# Patient Record
Sex: FEMALE | Race: WHITE | NOT HISPANIC OR LATINO | Employment: OTHER | URBAN - METROPOLITAN AREA
[De-identification: names, ages, dates, MRNs, and addresses within clinical notes are randomized per-mention and may not be internally consistent; named-entity substitution may affect disease eponyms.]

---

## 2021-12-30 ENCOUNTER — HOSPITAL ENCOUNTER (INPATIENT)
Facility: MEDICAL CENTER | Age: 81
LOS: 3 days | DRG: 682 | End: 2022-01-03
Attending: EMERGENCY MEDICINE | Admitting: STUDENT IN AN ORGANIZED HEALTH CARE EDUCATION/TRAINING PROGRAM
Payer: MEDICARE

## 2021-12-30 ENCOUNTER — APPOINTMENT (OUTPATIENT)
Dept: RADIOLOGY | Facility: MEDICAL CENTER | Age: 81
DRG: 682 | End: 2021-12-30
Attending: EMERGENCY MEDICINE
Payer: MEDICARE

## 2021-12-30 DIAGNOSIS — I25.810 CORONARY ARTERY DISEASE INVOLVING CORONARY BYPASS GRAFT OF NATIVE HEART WITHOUT ANGINA PECTORIS: ICD-10-CM

## 2021-12-30 DIAGNOSIS — I50.20 HFREF (HEART FAILURE WITH REDUCED EJECTION FRACTION) (HCC): ICD-10-CM

## 2021-12-30 DIAGNOSIS — N17.9 PRERENAL ACUTE RENAL FAILURE (HCC): ICD-10-CM

## 2021-12-30 DIAGNOSIS — R42 DIZZINESS: ICD-10-CM

## 2021-12-30 LAB
ALBUMIN SERPL BCP-MCNC: 4.5 G/DL (ref 3.2–4.9)
ALBUMIN/GLOB SERPL: 1.3 G/DL
ALP SERPL-CCNC: 60 U/L (ref 30–99)
ALT SERPL-CCNC: 13 U/L (ref 2–50)
ANION GAP SERPL CALC-SCNC: 14 MMOL/L (ref 7–16)
AST SERPL-CCNC: 27 U/L (ref 12–45)
BASOPHILS # BLD AUTO: 0.7 % (ref 0–1.8)
BASOPHILS # BLD: 0.05 K/UL (ref 0–0.12)
BILIRUB SERPL-MCNC: 0.4 MG/DL (ref 0.1–1.5)
BUN SERPL-MCNC: 81 MG/DL (ref 8–22)
CALCIUM SERPL-MCNC: 9.6 MG/DL (ref 8.4–10.2)
CHLORIDE SERPL-SCNC: 98 MMOL/L (ref 96–112)
CO2 SERPL-SCNC: 25 MMOL/L (ref 20–33)
CREAT SERPL-MCNC: 2.17 MG/DL (ref 0.5–1.4)
EKG IMPRESSION: NORMAL
EOSINOPHIL # BLD AUTO: 0.08 K/UL (ref 0–0.51)
EOSINOPHIL NFR BLD: 1.1 % (ref 0–6.9)
ERYTHROCYTE [DISTWIDTH] IN BLOOD BY AUTOMATED COUNT: 47.4 FL (ref 35.9–50)
GLOBULIN SER CALC-MCNC: 3.5 G/DL (ref 1.9–3.5)
GLUCOSE SERPL-MCNC: 163 MG/DL (ref 65–99)
HCT VFR BLD AUTO: 32.1 % (ref 37–47)
HGB BLD-MCNC: 9.9 G/DL (ref 12–16)
IMM GRANULOCYTES # BLD AUTO: 0.01 K/UL (ref 0–0.11)
IMM GRANULOCYTES NFR BLD AUTO: 0.1 % (ref 0–0.9)
LYMPHOCYTES # BLD AUTO: 2.28 K/UL (ref 1–4.8)
LYMPHOCYTES NFR BLD: 31 % (ref 22–41)
MCH RBC QN AUTO: 28.8 PG (ref 27–33)
MCHC RBC AUTO-ENTMCNC: 30.8 G/DL (ref 33.6–35)
MCV RBC AUTO: 93.3 FL (ref 81.4–97.8)
MONOCYTES # BLD AUTO: 0.53 K/UL (ref 0–0.85)
MONOCYTES NFR BLD AUTO: 7.2 % (ref 0–13.4)
NEUTROPHILS # BLD AUTO: 4.41 K/UL (ref 2–7.15)
NEUTROPHILS NFR BLD: 59.9 % (ref 44–72)
NRBC # BLD AUTO: 0 K/UL
NRBC BLD-RTO: 0 /100 WBC
PLATELET # BLD AUTO: 198 K/UL (ref 164–446)
PMV BLD AUTO: 10.1 FL (ref 9–12.9)
POTASSIUM SERPL-SCNC: 4.4 MMOL/L (ref 3.6–5.5)
PROT SERPL-MCNC: 8 G/DL (ref 6–8.2)
RBC # BLD AUTO: 3.44 M/UL (ref 4.2–5.4)
SODIUM SERPL-SCNC: 137 MMOL/L (ref 135–145)
TROPONIN T SERPL-MCNC: 54 NG/L (ref 6–19)
WBC # BLD AUTO: 7.4 K/UL (ref 4.8–10.8)

## 2021-12-30 PROCEDURE — 93005 ELECTROCARDIOGRAM TRACING: CPT

## 2021-12-30 PROCEDURE — 93005 ELECTROCARDIOGRAM TRACING: CPT | Performed by: EMERGENCY MEDICINE

## 2021-12-30 PROCEDURE — 70450 CT HEAD/BRAIN W/O DYE: CPT

## 2021-12-30 PROCEDURE — 36415 COLL VENOUS BLD VENIPUNCTURE: CPT

## 2021-12-30 PROCEDURE — 80053 COMPREHEN METABOLIC PANEL: CPT

## 2021-12-30 PROCEDURE — 85025 COMPLETE CBC W/AUTO DIFF WBC: CPT

## 2021-12-30 PROCEDURE — 99285 EMERGENCY DEPT VISIT HI MDM: CPT

## 2021-12-30 PROCEDURE — 84484 ASSAY OF TROPONIN QUANT: CPT

## 2021-12-31 PROBLEM — I50.20 HFREF (HEART FAILURE WITH REDUCED EJECTION FRACTION) (HCC): Status: ACTIVE | Noted: 2021-12-31

## 2021-12-31 PROBLEM — R42 POSTURAL DIZZINESS WITH PRESYNCOPE: Status: ACTIVE | Noted: 2021-12-31

## 2021-12-31 PROBLEM — R55 POSTURAL DIZZINESS WITH PRESYNCOPE: Status: ACTIVE | Noted: 2021-12-31

## 2021-12-31 PROBLEM — R79.89 ELEVATED TROPONIN: Status: ACTIVE | Noted: 2021-12-31

## 2021-12-31 PROBLEM — E78.5 HYPERLIPIDEMIA: Status: ACTIVE | Noted: 2021-12-31

## 2021-12-31 PROBLEM — E11.65 TYPE 2 DIABETES MELLITUS WITH HYPERGLYCEMIA, WITHOUT LONG-TERM CURRENT USE OF INSULIN (HCC): Status: ACTIVE | Noted: 2021-12-31

## 2021-12-31 PROBLEM — E03.9 ACQUIRED HYPOTHYROIDISM: Status: ACTIVE | Noted: 2021-12-31

## 2021-12-31 PROBLEM — N17.9 ACUTE KIDNEY INJURY SUPERIMPOSED ON CHRONIC KIDNEY DISEASE (HCC): Status: ACTIVE | Noted: 2021-12-31

## 2021-12-31 PROBLEM — I10 PRIMARY HYPERTENSION: Status: ACTIVE | Noted: 2021-12-31

## 2021-12-31 PROBLEM — K21.9 GASTROESOPHAGEAL REFLUX DISEASE WITHOUT ESOPHAGITIS: Status: ACTIVE | Noted: 2021-12-31

## 2021-12-31 PROBLEM — I25.810 CORONARY ARTERY DISEASE INVOLVING CORONARY BYPASS GRAFT OF NATIVE HEART WITHOUT ANGINA PECTORIS: Status: ACTIVE | Noted: 2021-12-31

## 2021-12-31 PROBLEM — N18.9 ACUTE KIDNEY INJURY SUPERIMPOSED ON CHRONIC KIDNEY DISEASE (HCC): Status: ACTIVE | Noted: 2021-12-31

## 2021-12-31 LAB
APPEARANCE UR: CLEAR
BACTERIA #/AREA URNS HPF: ABNORMAL /HPF
BILIRUB UR QL STRIP.AUTO: NEGATIVE
CK SERPL-CCNC: 143 U/L (ref 0–154)
COLOR UR: YELLOW
EPI CELLS #/AREA URNS HPF: ABNORMAL /HPF
GLUCOSE BLD-MCNC: 140 MG/DL (ref 65–99)
GLUCOSE BLD-MCNC: 184 MG/DL (ref 65–99)
GLUCOSE BLD-MCNC: 223 MG/DL (ref 65–99)
GLUCOSE UR STRIP.AUTO-MCNC: NEGATIVE MG/DL
KETONES UR STRIP.AUTO-MCNC: NEGATIVE MG/DL
LEUKOCYTE ESTERASE UR QL STRIP.AUTO: ABNORMAL
MAGNESIUM SERPL-MCNC: 2.4 MG/DL (ref 1.5–2.5)
MICRO URNS: ABNORMAL
NITRITE UR QL STRIP.AUTO: NEGATIVE
PH UR STRIP.AUTO: 5 [PH] (ref 5–8)
PHOSPHATE SERPL-MCNC: 4.5 MG/DL (ref 2.5–4.5)
PROT UR QL STRIP: 30 MG/DL
RBC # URNS HPF: ABNORMAL /HPF
RBC UR QL AUTO: NEGATIVE
SP GR UR STRIP.AUTO: 1.01
TROPONIN T SERPL-MCNC: 58 NG/L (ref 6–19)
TROPONIN T SERPL-MCNC: 67 NG/L (ref 6–19)
WBC #/AREA URNS HPF: ABNORMAL /HPF

## 2021-12-31 PROCEDURE — 700105 HCHG RX REV CODE 258: Performed by: STUDENT IN AN ORGANIZED HEALTH CARE EDUCATION/TRAINING PROGRAM

## 2021-12-31 PROCEDURE — 82962 GLUCOSE BLOOD TEST: CPT | Mod: 91

## 2021-12-31 PROCEDURE — A9270 NON-COVERED ITEM OR SERVICE: HCPCS | Performed by: STUDENT IN AN ORGANIZED HEALTH CARE EDUCATION/TRAINING PROGRAM

## 2021-12-31 PROCEDURE — 770006 HCHG ROOM/CARE - MED/SURG/GYN SEMI*

## 2021-12-31 PROCEDURE — 81001 URINALYSIS AUTO W/SCOPE: CPT

## 2021-12-31 PROCEDURE — 96372 THER/PROPH/DIAG INJ SC/IM: CPT

## 2021-12-31 PROCEDURE — 700102 HCHG RX REV CODE 250 W/ 637 OVERRIDE(OP): Performed by: STUDENT IN AN ORGANIZED HEALTH CARE EDUCATION/TRAINING PROGRAM

## 2021-12-31 PROCEDURE — 700101 HCHG RX REV CODE 250: Performed by: STUDENT IN AN ORGANIZED HEALTH CARE EDUCATION/TRAINING PROGRAM

## 2021-12-31 PROCEDURE — 83735 ASSAY OF MAGNESIUM: CPT

## 2021-12-31 PROCEDURE — 84100 ASSAY OF PHOSPHORUS: CPT

## 2021-12-31 PROCEDURE — 82550 ASSAY OF CK (CPK): CPT

## 2021-12-31 PROCEDURE — 700111 HCHG RX REV CODE 636 W/ 250 OVERRIDE (IP): Performed by: STUDENT IN AN ORGANIZED HEALTH CARE EDUCATION/TRAINING PROGRAM

## 2021-12-31 PROCEDURE — 84484 ASSAY OF TROPONIN QUANT: CPT | Mod: 91

## 2021-12-31 PROCEDURE — 99223 1ST HOSP IP/OBS HIGH 75: CPT | Mod: AI | Performed by: STUDENT IN AN ORGANIZED HEALTH CARE EDUCATION/TRAINING PROGRAM

## 2021-12-31 RX ORDER — TRAZODONE HYDROCHLORIDE 50 MG/1
50 TABLET ORAL NIGHTLY PRN
COMMUNITY
Start: 2021-08-19

## 2021-12-31 RX ORDER — INSULIN GLARGINE 100 [IU]/ML
22 INJECTION, SOLUTION SUBCUTANEOUS EVERY EVENING
COMMUNITY
Start: 2021-10-12

## 2021-12-31 RX ORDER — LEVOTHYROXINE SODIUM 175 UG/1
175 TABLET ORAL
Status: DISCONTINUED | OUTPATIENT
Start: 2021-12-31 | End: 2022-01-03 | Stop reason: HOSPADM

## 2021-12-31 RX ORDER — ACETAMINOPHEN 325 MG/1
650 TABLET ORAL EVERY 6 HOURS PRN
Status: DISCONTINUED | OUTPATIENT
Start: 2021-12-31 | End: 2022-01-03 | Stop reason: HOSPADM

## 2021-12-31 RX ORDER — LIDOCAINE 50 MG/G
1 PATCH TOPICAL EVERY 24 HOURS
Status: DISCONTINUED | OUTPATIENT
Start: 2021-12-31 | End: 2022-01-03 | Stop reason: HOSPADM

## 2021-12-31 RX ORDER — AMOXICILLIN 500 MG/1
CAPSULE ORAL
Status: SHIPPED | COMMUNITY
Start: 2021-09-27 | End: 2021-12-31

## 2021-12-31 RX ORDER — PANTOPRAZOLE SODIUM 40 MG/1
40 TABLET, DELAYED RELEASE ORAL DAILY
Status: DISCONTINUED | OUTPATIENT
Start: 2021-12-31 | End: 2021-12-31

## 2021-12-31 RX ORDER — GABAPENTIN 600 MG/1
1 TABLET ORAL 2 TIMES DAILY
COMMUNITY
Start: 2021-10-12

## 2021-12-31 RX ORDER — TRAZODONE HYDROCHLORIDE 50 MG/1
50 TABLET ORAL
Status: DISCONTINUED | OUTPATIENT
Start: 2021-12-31 | End: 2022-01-03 | Stop reason: HOSPADM

## 2021-12-31 RX ORDER — ATORVASTATIN CALCIUM 20 MG/1
20 TABLET, FILM COATED ORAL DAILY
Status: DISCONTINUED | OUTPATIENT
Start: 2021-12-31 | End: 2022-01-03 | Stop reason: HOSPADM

## 2021-12-31 RX ORDER — FUROSEMIDE 40 MG/1
40 TABLET ORAL
Status: ON HOLD | COMMUNITY
Start: 2021-11-28 | End: 2022-01-03

## 2021-12-31 RX ORDER — POLYETHYLENE GLYCOL 3350 17 G/17G
1 POWDER, FOR SOLUTION ORAL
Status: DISCONTINUED | OUTPATIENT
Start: 2021-12-31 | End: 2022-01-03 | Stop reason: HOSPADM

## 2021-12-31 RX ORDER — CARVEDILOL 3.12 MG/1
3.12 TABLET ORAL 2 TIMES DAILY
Status: ON HOLD | COMMUNITY
Start: 2021-12-28 | End: 2022-01-03

## 2021-12-31 RX ORDER — CONJUGATED ESTROGENS 0.62 MG/G
2 CREAM VAGINAL
Status: SHIPPED | COMMUNITY
Start: 2021-11-10 | End: 2021-12-31

## 2021-12-31 RX ORDER — OMEPRAZOLE 20 MG/1
20 CAPSULE, DELAYED RELEASE ORAL DAILY
Status: DISCONTINUED | OUTPATIENT
Start: 2021-12-31 | End: 2022-01-03 | Stop reason: HOSPADM

## 2021-12-31 RX ORDER — LEVOTHYROXINE SODIUM 175 UG/1
175 TABLET ORAL
COMMUNITY
Start: 2021-12-01

## 2021-12-31 RX ORDER — BISACODYL 10 MG
10 SUPPOSITORY, RECTAL RECTAL
Status: DISCONTINUED | OUTPATIENT
Start: 2021-12-31 | End: 2022-01-03 | Stop reason: HOSPADM

## 2021-12-31 RX ORDER — SODIUM CHLORIDE, SODIUM LACTATE, POTASSIUM CHLORIDE, CALCIUM CHLORIDE 600; 310; 30; 20 MG/100ML; MG/100ML; MG/100ML; MG/100ML
INJECTION, SOLUTION INTRAVENOUS CONTINUOUS
Status: DISCONTINUED | OUTPATIENT
Start: 2021-12-31 | End: 2022-01-01

## 2021-12-31 RX ORDER — CHLORHEXIDINE GLUCONATE ORAL RINSE 1.2 MG/ML
SOLUTION DENTAL
Status: SHIPPED | COMMUNITY
Start: 2021-10-20 | End: 2021-12-31

## 2021-12-31 RX ORDER — CARVEDILOL 6.25 MG/1
3.12 TABLET ORAL 2 TIMES DAILY WITH MEALS
Status: DISCONTINUED | OUTPATIENT
Start: 2021-12-31 | End: 2021-12-31

## 2021-12-31 RX ORDER — HEPARIN SODIUM 5000 [USP'U]/ML
5000 INJECTION, SOLUTION INTRAVENOUS; SUBCUTANEOUS EVERY 8 HOURS
Status: DISCONTINUED | OUTPATIENT
Start: 2021-12-31 | End: 2022-01-03 | Stop reason: HOSPADM

## 2021-12-31 RX ORDER — AMOXICILLIN 250 MG
2 CAPSULE ORAL 2 TIMES DAILY
Status: DISCONTINUED | OUTPATIENT
Start: 2021-12-31 | End: 2022-01-03 | Stop reason: HOSPADM

## 2021-12-31 RX ORDER — ACETAMINOPHEN 500 MG
500-1000 TABLET ORAL EVERY 6 HOURS PRN
Status: ON HOLD | COMMUNITY
End: 2022-01-03

## 2021-12-31 RX ORDER — ONDANSETRON 2 MG/ML
4 INJECTION INTRAMUSCULAR; INTRAVENOUS EVERY 4 HOURS PRN
Status: DISCONTINUED | OUTPATIENT
Start: 2021-12-31 | End: 2022-01-03 | Stop reason: HOSPADM

## 2021-12-31 RX ORDER — ONDANSETRON 4 MG/1
4 TABLET, ORALLY DISINTEGRATING ORAL EVERY 4 HOURS PRN
Status: DISCONTINUED | OUTPATIENT
Start: 2021-12-31 | End: 2022-01-03 | Stop reason: HOSPADM

## 2021-12-31 RX ORDER — LABETALOL HYDROCHLORIDE 5 MG/ML
10 INJECTION, SOLUTION INTRAVENOUS EVERY 4 HOURS PRN
Status: DISCONTINUED | OUTPATIENT
Start: 2021-12-31 | End: 2022-01-03 | Stop reason: HOSPADM

## 2021-12-31 RX ORDER — DEXTROSE MONOHYDRATE 25 G/50ML
50 INJECTION, SOLUTION INTRAVENOUS
Status: DISCONTINUED | OUTPATIENT
Start: 2021-12-31 | End: 2022-01-03 | Stop reason: HOSPADM

## 2021-12-31 RX ORDER — PANTOPRAZOLE SODIUM 40 MG/1
40 TABLET, DELAYED RELEASE ORAL DAILY
COMMUNITY

## 2021-12-31 RX ORDER — ATORVASTATIN CALCIUM 20 MG/1
20 TABLET, FILM COATED ORAL DAILY
COMMUNITY
Start: 2021-08-19 | End: 2022-02-15

## 2021-12-31 RX ORDER — CARVEDILOL 3.12 MG/1
3.12 TABLET ORAL
Status: SHIPPED | COMMUNITY
Start: 2021-11-28 | End: 2021-12-31

## 2021-12-31 RX ORDER — HYDRALAZINE HYDROCHLORIDE 10 MG/1
TABLET, FILM COATED ORAL
Status: SHIPPED | COMMUNITY
Start: 2021-12-28 | End: 2021-12-31

## 2021-12-31 RX ORDER — GABAPENTIN 300 MG/1
600 CAPSULE ORAL 3 TIMES DAILY
Status: DISCONTINUED | OUTPATIENT
Start: 2021-12-31 | End: 2022-01-03 | Stop reason: HOSPADM

## 2021-12-31 RX ADMIN — ASPIRIN 81 MG: 81 TABLET, COATED ORAL at 08:12

## 2021-12-31 RX ADMIN — HEPARIN SODIUM 5000 UNITS: 5000 INJECTION, SOLUTION INTRAVENOUS; SUBCUTANEOUS at 13:45

## 2021-12-31 RX ADMIN — GABAPENTIN 600 MG: 300 CAPSULE ORAL at 08:08

## 2021-12-31 RX ADMIN — LIDOCAINE 1 PATCH: 50 PATCH TOPICAL at 06:14

## 2021-12-31 RX ADMIN — ATORVASTATIN CALCIUM 20 MG: 20 TABLET, FILM COATED ORAL at 08:08

## 2021-12-31 RX ADMIN — SERTRALINE HYDROCHLORIDE 50 MG: 50 TABLET ORAL at 08:00

## 2021-12-31 RX ADMIN — INSULIN HUMAN 4 UNITS: 100 INJECTION, SOLUTION PARENTERAL at 17:22

## 2021-12-31 RX ADMIN — GABAPENTIN 600 MG: 300 CAPSULE ORAL at 18:00

## 2021-12-31 RX ADMIN — HEPARIN SODIUM 5000 UNITS: 5000 INJECTION, SOLUTION INTRAVENOUS; SUBCUTANEOUS at 21:15

## 2021-12-31 RX ADMIN — INSULIN HUMAN 3 UNITS: 100 INJECTION, SOLUTION PARENTERAL at 11:12

## 2021-12-31 RX ADMIN — INSULIN GLARGINE 20 UNITS: 100 INJECTION, SOLUTION SUBCUTANEOUS at 17:23

## 2021-12-31 RX ADMIN — ACETAMINOPHEN 650 MG: 325 TABLET, FILM COATED ORAL at 04:34

## 2021-12-31 RX ADMIN — SENNOSIDES AND DOCUSATE SODIUM 2 TABLET: 50; 8.6 TABLET ORAL at 18:00

## 2021-12-31 RX ADMIN — GABAPENTIN 600 MG: 300 CAPSULE ORAL at 13:45

## 2021-12-31 RX ADMIN — LEVOTHYROXINE SODIUM 175 MCG: 0.17 TABLET ORAL at 06:14

## 2021-12-31 RX ADMIN — OMEPRAZOLE 20 MG: 20 CAPSULE, DELAYED RELEASE ORAL at 08:09

## 2021-12-31 RX ADMIN — HEPARIN SODIUM 5000 UNITS: 5000 INJECTION, SOLUTION INTRAVENOUS; SUBCUTANEOUS at 03:37

## 2021-12-31 RX ADMIN — SODIUM CHLORIDE, POTASSIUM CHLORIDE, SODIUM LACTATE AND CALCIUM CHLORIDE: 600; 310; 30; 20 INJECTION, SOLUTION INTRAVENOUS at 03:37

## 2021-12-31 ASSESSMENT — LIFESTYLE VARIABLES
HAVE YOU EVER FELT YOU SHOULD CUT DOWN ON YOUR DRINKING: NO
TOTAL SCORE: 0
SUBSTANCE_ABUSE: 0
CONSUMPTION TOTAL: NEGATIVE
TOTAL SCORE: 0
HAVE PEOPLE ANNOYED YOU BY CRITICIZING YOUR DRINKING: NO
ON A TYPICAL DAY WHEN YOU DRINK ALCOHOL HOW MANY DRINKS DO YOU HAVE: 0
EVER HAD A DRINK FIRST THING IN THE MORNING TO STEADY YOUR NERVES TO GET RID OF A HANGOVER: NO
TOTAL SCORE: 0
EVER FELT BAD OR GUILTY ABOUT YOUR DRINKING: NO
AVERAGE NUMBER OF DAYS PER WEEK YOU HAVE A DRINK CONTAINING ALCOHOL: 0
ALCOHOL_USE: NO
HOW MANY TIMES IN THE PAST YEAR HAVE YOU HAD 5 OR MORE DRINKS IN A DAY: 0

## 2021-12-31 ASSESSMENT — ENCOUNTER SYMPTOMS
CHILLS: 0
FEVER: 0
NERVOUS/ANXIOUS: 0
EYE PAIN: 0
SEIZURES: 0
DIZZINESS: 1
VOMITING: 0
MYALGIAS: 0
NAUSEA: 0
SHORTNESS OF BREATH: 0
PALPITATIONS: 0
SPEECH CHANGE: 0
SPUTUM PRODUCTION: 0
SENSORY CHANGE: 0
NECK PAIN: 0
LOSS OF CONSCIOUSNESS: 0
WEAKNESS: 1
DIARRHEA: 0
FOCAL WEAKNESS: 0
COUGH: 0
ABDOMINAL PAIN: 0
PHOTOPHOBIA: 0

## 2021-12-31 ASSESSMENT — COGNITIVE AND FUNCTIONAL STATUS - GENERAL
WALKING IN HOSPITAL ROOM: A LITTLE
MOBILITY SCORE: 21
DAILY ACTIVITIY SCORE: 24
SUGGESTED CMS G CODE MODIFIER DAILY ACTIVITY: CH
STANDING UP FROM CHAIR USING ARMS: A LITTLE
CLIMB 3 TO 5 STEPS WITH RAILING: A LITTLE
SUGGESTED CMS G CODE MODIFIER MOBILITY: CJ

## 2021-12-31 ASSESSMENT — PAIN DESCRIPTION - PAIN TYPE
TYPE: ACUTE PAIN

## 2021-12-31 ASSESSMENT — PATIENT HEALTH QUESTIONNAIRE - PHQ9
1. LITTLE INTEREST OR PLEASURE IN DOING THINGS: NOT AT ALL
SUM OF ALL RESPONSES TO PHQ9 QUESTIONS 1 AND 2: 0
2. FEELING DOWN, DEPRESSED, IRRITABLE, OR HOPELESS: NOT AT ALL

## 2021-12-31 ASSESSMENT — FIBROSIS 4 INDEX: FIB4 SCORE: 3.03

## 2021-12-31 NOTE — ASSESSMENT & PLAN NOTE
Per outpatient cardiology note in care everywhere due to worsening renal function she is not on ACE inhibitor, ARB or Entresto, not on Aldactone  Echocardiogram done 1/1/2022 showing EF 25%, reduced right ventricular systolic function, mild mitral and tricuspid regurgitation.  RVSP 55.  Continue aspirin, hold BB pending stress test

## 2021-12-31 NOTE — ASSESSMENT & PLAN NOTE
Experienced outpatient x2 with decreased p.o. intake and continued diuretic use.  Patient also reports she was recently started on carvedilol.  Carvedilol was held on admission, transitioned to metoprolol for less systemic effects.  Monitor on telemetry.  IV fluids

## 2021-12-31 NOTE — H&P
Hospital Medicine History & Physical Note    Date of Service  12/31/2021    Primary Care Physician  No primary care provider on file.    Consultants  None    Code Status  Full Code    Chief Complaint  Chief Complaint   Patient presents with   • Dizziness     dizzy spell while walking in the mall, had an assisted fall to the ground. Denies LOC and remembers the entire event. No hx of similar events. Recently began taking carvedilol a month ago but has never had any issues.        History of Presenting Illness  Bita Griffiths is a 81 y.o. female from [facility] visiting family with medical history of HFrEF prior EF 25 to 30%, CAD status post 5 vessel CABG and mitral valve repair CKD with creatinine of 2.3 in November 2021, hypertension, hyperlipidemia, iron deficient anemia, diabetes who presented 12/30/2021 with dizziness, 2 episodes of presyncope.  Patient is visiting family from [place] and notes that she has not been having normal p.o. intake due to concerns of not wanting to urinate while traveling, she has continued to take her diuretic.  She had an episode of presyncope while walking around the mall earlier in the day felt dizzy family members helped her to the ground later down in her symptoms were away, she did not lose consciousness.  She came to the ED for evaluation while in the ED since she was feeling better she got up and was walking herself to the bathroom when she began feeling dizzy lightheaded needed to be helped to the floor, again did not lose consciousness.  She did take her diuretic after her first episode of presyncope earlier in the day, she did not increase her p.o. intake.  Patient is unsure about a lot of her medical history most information obtained through chart review and care everywhere.  She denies recent infectious symptoms such as fevers or chills, cough or dyspnea, nausea vomiting diarrhea dysuria.  Symptoms started acutely, aggravated by standing up alleviated by  lying down.  In the ED initial blood pressure 87/41, slightly tachypneic otherwise normal vitals.  Initial work-up no leukocytosis, hemoglobin 9.9, CHEM panel with normal electrolytes, BUN 81 creatinine 2.17 which is up from prior BUN 50 serum creatinine 1.8, troponin T mildly elevated 54.  CT head negative for acute cerebral infarction hemorrhage or mass lesion, hypodensity in the right parieto-occipital lobe likely encephalomalacia.  Patient was given IV fluid subsequently referred to hospitalist for admission for presyncope, dehydration, FREDA on CKD.    I discussed the plan of care with patient, family, bedside RN and pharmacy.    Review of Systems  Review of Systems   Constitutional: Negative for chills and fever.   HENT: Negative for congestion and nosebleeds.    Eyes: Negative for photophobia and pain.   Respiratory: Negative for cough, sputum production and shortness of breath.    Cardiovascular: Negative for chest pain and palpitations.   Gastrointestinal: Negative for abdominal pain, diarrhea, nausea and vomiting.   Genitourinary: Negative for dysuria and urgency.   Musculoskeletal: Negative for myalgias and neck pain.   Neurological: Positive for dizziness and weakness. Negative for sensory change, speech change, focal weakness, seizures and loss of consciousness.   Psychiatric/Behavioral: Negative for substance abuse. The patient is not nervous/anxious.        Past Medical History   has no past medical history on file.    Surgical History   has no past surgical history on file.     Family History  family history is not on file.   Family history HTN    Social History   reports that she has never smoked. She has never used smokeless tobacco. She reports that she does not drink alcohol and does not use drugs.    Allergies  Allergies   Allergen Reactions   • Sulfa Drugs      Stomach ulcers       Medications  Prior to Admission Medications   Prescriptions Last Dose Informant Patient Reported? Taking?    atorvastatin (LIPITOR) 20 MG Tab 12/30/2021 at 0900  Yes Yes   Sig: Take 1 Tablet by mouth every day.   carvedilol (COREG) 3.125 MG Tab   Yes Yes   Sig: Take 3.125 mg by mouth 2 times a day.   epoetin dari (EPOGEN/PROCRIT) 91721 UNIT/ML Solution   Yes No   furosemide (LASIX) 40 MG Tab 12/30/2021 at 0900  Yes Yes   Sig: Take 40 mg by mouth every day.   gabapentin (NEURONTIN) 600 MG tablet 12/30/2021 at 0900  Yes Yes   Sig: Take 1 Tablet by mouth 3 times a day.   hydrALAZINE (APRESOLINE) 10 MG Tab 12/30/2021 at 0900  Yes Yes   Sig: Indications: high blood pressure Take 10mg po 2 times daily   insulin glargine (LANTUS SOLOSTAR) 100 UNIT/ML Solution Pen-injector injection 12/30/2021 at 0200  Yes Yes   Sig: INJECT 22 UNITS UNDER THE SKIN DAILY AT BEDTIME   levothyroxine (SYNTHROID) 175 MCG Tab 12/30/2021 at 0900 Patient Yes Yes   Sig: Take 175 mcg by mouth every morning on an empty stomach.   pantoprazole (PROTONIX) 40 MG Tablet Delayed Response 12/30/2021 at 0900  Yes Yes   Sig: Take 40 mg by mouth every day.   sertraline (ZOLOFT) 50 MG Tab 12/30/2021 at 0900  Yes Yes   Sig: Take 1 Tablet by mouth every morning.   traZODone (DESYREL) 50 MG Tab   Yes Yes   Sig: TAKE 1 TABLET DAILY AT BEDTIME IF NEEDED      Facility-Administered Medications: None       Physical Exam  Temp:  [36.4 °C (97.6 °F)] 36.4 °C (97.6 °F)  Pulse:  [77-92] 84  Resp:  [12-21] 16  BP: ()/(41-63) 107/44  SpO2:  [91 %-98 %] 92 %  Blood Pressure : 120/49   Temperature: 36.4 °C (97.6 °F)   Pulse: 84   Respiration: 12   Pulse Oximetry: 92 %       Physical Exam  Vitals and nursing note reviewed. Exam conducted with a chaperone present.   Constitutional:       General: She is not in acute distress.     Appearance: She is obese. She is not toxic-appearing.   HENT:      Head: Normocephalic and atraumatic.      Nose: Nose normal. No rhinorrhea.      Mouth/Throat:      Mouth: Mucous membranes are dry.      Pharynx: Oropharynx is clear.   Eyes:       Extraocular Movements: Extraocular movements intact.      Conjunctiva/sclera: Conjunctivae normal.      Pupils: Pupils are equal, round, and reactive to light.   Cardiovascular:      Rate and Rhythm: Normal rate.      Pulses: Normal pulses.      Heart sounds: Murmur (systolic) heard.       Pulmonary:      Effort: Pulmonary effort is normal. No respiratory distress.      Breath sounds: No wheezing, rhonchi or rales.   Abdominal:      General: Bowel sounds are normal.      Palpations: Abdomen is soft.      Tenderness: There is no abdominal tenderness. There is no guarding or rebound.   Musculoskeletal:         General: No tenderness or deformity. Normal range of motion.      Cervical back: Normal range of motion and neck supple.   Skin:     General: Skin is warm and dry.      Capillary Refill: Capillary refill takes less than 2 seconds.   Neurological:      General: No focal deficit present.      Mental Status: She is alert and oriented to person, place, and time. Mental status is at baseline.      Cranial Nerves: No cranial nerve deficit.      Sensory: No sensory deficit.      Motor: No weakness.   Psychiatric:         Mood and Affect: Mood normal.         Behavior: Behavior normal.         Thought Content: Thought content normal.         Judgment: Judgment normal.         Laboratory:  Recent Labs     12/30/21 2250   WBC 7.4   RBC 3.44*   HEMOGLOBIN 9.9*   HEMATOCRIT 32.1*   MCV 93.3   MCH 28.8   MCHC 30.8*   RDW 47.4   PLATELETCT 198   MPV 10.1     Recent Labs     12/30/21 2250   SODIUM 137   POTASSIUM 4.4   CHLORIDE 98   CO2 25   GLUCOSE 163*   BUN 81*   CREATININE 2.17*   CALCIUM 9.6     Recent Labs     12/30/21 2250   ALTSGPT 13   ASTSGOT 27   ALKPHOSPHAT 60   TBILIRUBIN 0.4   GLUCOSE 163*         No results for input(s): NTPROBNP in the last 72 hours.      Recent Labs     12/30/21 2250 12/31/21  0140   TROPONINT 54* 58*       Imaging:  CT-HEAD W/O   Final Result      1.  Head CT without contrast with  evidence of acute cerebral infarction, hemorrhage or mass lesion.   2.  Hypodensity in the right parieto-occipital lobe, likely encephalomalacia.          X-Ray:  No film today  EKG:  My impression is: Sinus rhythm, first-degree AV block, LVH with IVCD, no prior to compare    Assessment/Plan:  I anticipate this patient will require at least two midnights for appropriate medical management, necessitating inpatient admission.    * Acute kidney injury superimposed on chronic kidney disease (HCC)- (present on admission)  Assessment & Plan  Suspecting Prerenal given decreased p.o. intake, continued diuretic use despite presyncopal episodes and clinically dry.  Nonoliguric with normal K, Ca, P  FeNa studies ordered  U/a & CPK  Rule out post obstruction  IVF  Renal dose meds and avoid nephrotoxins  Monitor I&O's  Follow renal function  No need for urgent RRT    Coronary artery disease involving coronary bypass graft of native heart without angina pectoris- (present on admission)  Assessment & Plan  Status post 5 vessel CABG and mitral valve repair  Continue aspirin, statin  Holding beta-blocker for episode of presyncope x2 with hypovolemia.  Reinitiate when appropriate.  Due to worsening renal function she is not on ACE inhibitor or ARB Entresto or Aldactone per outpatient cardiology note.    HFrEF (heart failure with reduced ejection fraction) ([facility])- (present on admission)  Assessment & Plan  Continue aspirin, holding Coreg given presyncope and episodes of hypotension and hypovolemia.  Per outpatient cardiology note in care everywhere due to worsening renal function she is not on ACE inhibitor, ARB or Entresto, not on Aldactone  Prior EF 25 to 30%    Type 2 diabetes mellitus with hyperglycemia, without long-term current use of insulin ([facility])- (present on admission)  Assessment & Plan  Insulin sliding scale  Continue long-acting insulin    Elevated troponin- (present on admission)  Assessment & Plan  In setting of  dehydration and FREDA on CKD, mildly elevated into the 50s, no chest pain, EKG without any ST elevations or depressions.  Trend troponin  Repeat EKG with any chest pain    Hyperlipidemia- (present on admission)  Assessment & Plan  Continue statin    Gastroesophageal reflux disease without esophagitis- (present on admission)  Assessment & Plan  Continue PPI    Primary hypertension- (present on admission)  Assessment & Plan  Given episode of hypotension and presyncope and dehydration we will hold home Coreg for now.  IV antihypertensives with parameters    Acquired hypothyroidism- (present on admission)  Assessment & Plan  Continue Synthroid    Postural dizziness with presyncope- (present on admission)  Assessment & Plan  Experienced outpatient x2 with decreased p.o. intake and continued diuretic use.  Check orthostatic blood pressure.  Monitor on telemetry.  IV fluids      VTE prophylaxis: heparin ppx

## 2021-12-31 NOTE — PROGRESS NOTES
Patient was seen and evaluated by myself and Dr. Nugent.  Patient was admitted earlier this morning.  See full H&P by Dr. Medina further details.    Patient is a 81-year-old female with a past medical history of HFrEF EF 25-30%, CAD s/p 5 vessel CABG, mitral valve repair, CKD with creatinine 2.3, hypertension, hyperlipidemia, JUAN, type 2 diabetes mellitus who presented 12/30/2021 with complaints of dizziness and 2 episodes of presyncope.  She states that she is visiting family for [place] and during her drive over the [employer], do not have access to her water due to it being in the back of the car and she had also continue to take her diuretic.  Additionally, she states that she had a recent cardiac medication change with her carvedilol but was unspecific regarding details..  She reports, while she was at the mall, she felt dizzy and requested family to help her to the ground to prevent her from falling.  She was seen in the emergency department and received for rehydration.  She states she is feeling better when she was turning from the bathroom, she began to feel dizzy/lightheaded again and was assisted to the floor.  She denies hitting her head or losing consciousness at any point.  She denies recent illness, fever, chills, nausea, vomiting, chest, headache, shortness of breath.  Blood pressure remains 100s-120s with holding diuretic and beta-blocker.  WBC normal and no signs of infection.  Na 137, K4.4, BUN 81, CR 2.17, GFR 22, P 4.5 DNA 0.6..  Serial troponin is noted to be elevated at 54, 58, 67.  EKG shows sinus rhythm with a rate of 85 bpm, left ventricular hypertrophy and first-degree AV block noted.  No ischemic changes seen.  The absence of chest pain, likely due to increased demand.  Head CT with no acute changes/processes.  Urine creatinine and sodium levels are pending.  At this time, continue with IV rehydration and hold diuretic and carvedilol.  As patient improves, will reintroduce  antihypertensive medications as appropriate.

## 2021-12-31 NOTE — ED PROVIDER NOTES
ED Provider Note    CHIEF COMPLAINT  Chief Complaint   Patient presents with   • Dizziness     dizzy spell while walking in the mall, had an assisted fall to the ground. Denies LOC and remembers the entire event. No hx of similar events. Recently began taking carvedilol a month ago but has never had any issues.        HPI  Bita Griffiths is a 81 y.o. female who presents to the emergency department complaining of lightheaded dizziness. Past medical history largely unknown by patient but states that she does take a diuretic. Currently visiting from [facility]. Today went to the mall and after walking in size she had sudden onset of lightheaded dizziness. After being assisted to the ground she felt significant better quite immediately. A little while later she then attempted to go to the bathroom as she had taken her diuretic and again became lightheaded and dizzy but repeated her sequence of sitting and lying down and again felt better. Currently while resting here in the ER now feeling better as well but she states that waiting time she stands up for a few minutes then she gets lightheaded and dizzy. Movement does not seem to exacerbate this. No headache or vision changes. No recent illness. No chest pain palpitations or shortness of breath.    Patient's  trinket my chart elena shows recent renal function of 52 and 1.8.     REVIEW OF SYSTEMS  See HPI for further details. All other systems are negative.     PAST MEDICAL HISTORY       SOCIAL HISTORY  Social History     Tobacco Use   • Smoking status: Never Smoker   • Smokeless tobacco: Never Used   Substance and Sexual Activity   • Alcohol use: Never   • Drug use: Never   • Sexual activity: Not on file       SURGICAL HISTORY  patient denies any surgical history    CURRENT MEDICATIONS  Home Medications     Reviewed by Jax Mendoza R.N. (Registered Nurse) on 12/30/21 at 1922  Med List Status: Not Addressed   Medication Last Dose Status        Patient Charly  "Taking any Medications                       ALLERGIES  Allergies   Allergen Reactions   • Sulfa Drugs      Stomach ulcers       PHYSICAL EXAM  VITAL SIGNS: BP (!) 87/41   Pulse 86   Temp 36.4 °C (97.6 °F) (Temporal)   Resp 18   Ht 1.651 m (5' 5\")   Wt 80.5 kg (177 lb 7.5 oz)   SpO2 94%   BMI 29.53 kg/m²  @JONATHAN[[id number]::@   Pulse ox interpretation: I interpret this pulse ox as normal.  Constitutional: Alert in no apparent distress.  HENT: No signs of trauma, Bilateral external ears normal, Nose normal.   Eyes: Pupils are equal and reactive  Neck: Normal range of motion, No tenderness, Supple  Cardiovascular: Regular rate and rhythm, no murmurs.   Thorax & Lungs: Normal breath sounds, No respiratory distress, No wheezing, No chest tenderness.   Abdomen: Bowel sounds normal, Soft, No tenderness  Skin: Warm, Dry, No erythema, No rash.   Extremities: Intact distal pulses  Musculoskeletal: Good range of motion in all major joints. No tenderness to palpation or major deformities noted.   Neurologic: Alert , Normal motor function, Normal sensory function, No focal deficits noted. Cerebellar intact. No nystagmus. Good finger to nose. Good [place].  Psychiatric: Affect normal, Judgment normal, Mood normal.       DIAGNOSTIC STUDIES / PROCEDURES      LABS  Results for orders placed or performed during the hospital encounter of 12/30/21   CBC w/ Differential   Result Value Ref Range    WBC 7.4 4.8 - 10.8 K/uL    RBC 3.44 (L) 4.20 - 5.40 M/uL    Hemoglobin 9.9 (L) 12.0 - 16.0 g/dL    Hematocrit 32.1 (L) 37.0 - 47.0 %    MCV 93.3 81.4 - 97.8 fL    MCH 28.8 27.0 - 33.0 pg    MCHC 30.8 (L) 33.6 - 35.0 g/dL    RDW 47.4 35.9 - 50.0 fL    Platelet Count 198 164 - 446 K/uL    MPV 10.1 9.0 - 12.9 fL    Neutrophils-Polys 59.90 44.00 - 72.00 %    Lymphocytes 31.00 22.00 - 41.00 %    Monocytes 7.20 0.00 - 13.40 %    Eosinophils 1.10 0.00 - 6.90 %    Basophils 0.70 0.00 - 1.80 %    Immature Granulocytes 0.10 0.00 - 0.90 %    " Nucleated RBC 0.00 /100 WBC    Neutrophils (Absolute) 4.41 2.00 - 7.15 K/uL    Lymphs (Absolute) 2.28 1.00 - 4.80 K/uL    Monos (Absolute) 0.53 0.00 - 0.85 K/uL    Eos (Absolute) 0.08 0.00 - 0.51 K/uL    Baso (Absolute) 0.05 0.00 - 0.12 K/uL    Immature Granulocytes (abs) 0.01 0.00 - 0.11 K/uL    NRBC (Absolute) 0.00 K/uL   Complete Metabolic Panel (CMP)   Result Value Ref Range    Sodium 137 135 - 145 mmol/L    Potassium 4.4 3.6 - 5.5 mmol/L    Chloride 98 96 - 112 mmol/L    Co2 25 20 - 33 mmol/L    Anion Gap 14.0 7.0 - 16.0    Glucose 163 (H) 65 - 99 mg/dL    Bun 81 (HH) 8 - 22 mg/dL    Creatinine 2.17 (H) 0.50 - 1.40 mg/dL    Calcium 9.6 8.4 - 10.2 mg/dL    AST(SGOT) 27 12 - 45 U/L    ALT(SGPT) 13 2 - 50 U/L    Alkaline Phosphatase 60 30 - 99 U/L    Total Bilirubin 0.4 0.1 - 1.5 mg/dL    Albumin 4.5 3.2 - 4.9 g/dL    Total Protein 8.0 6.0 - 8.2 g/dL    Globulin 3.5 1.9 - 3.5 g/dL    A-G Ratio 1.3 g/dL   Troponin STAT   Result Value Ref Range    Troponin T 54 (H) 6 - 19 ng/L   ESTIMATED GFR   Result Value Ref Range    GFR If  26 (A) >60 mL/min/1.73 m 2    GFR If Non African American 22 (A) >60 mL/min/1.73 m 2   EKG   Result Value Ref Range    Report       [facility] Emergency Dept.    Test Date:  2021  Pt Name:    SHASHANK ALARCON             Department: [facility]  MRN:        [id number]                      Room:  Gender:     Female                       Technician: DESHAWN  :        [id number]                   Requested By:ER TRIAGE PROTOCOL  Order #:    [id number]                    Reading MD: Jax Chaudhary    Measurements  Intervals                                Axis  Rate:       85                           P:          87  TN:         224                          QRS:        -52  QRSD:       120                          T:          93  QT:         416  QTc:        495    Interpretive Statements  SINUS RHYTHM  FIRST DEGREE AV BLOCK  CONSIDER LEFT ATRIAL ABNORMALITY  LVH  WITH IVCD, LAD AND SECONDARY REPOL ABNRM  No previous ECG available for comparison  Electronically Signed On 12- 23:56:33 PST by Jax Chaudhary         RADIOLOGY  CT-HEAD W/O   Final Result      1.  Head CT without contrast with evidence of acute cerebral infarction, hemorrhage or mass lesion.   2.  Hypodensity in the right parieto-occipital lobe, likely encephalomalacia.              COURSE & MEDICAL DECISION MAKING  Pertinent Labs & Imaging studies reviewed. (See chart for details)  81-year-old female presented emergency room with lightheaded dizziness. By history sounds orthostatic in nature. Exam is largely benign. Patient does take diuretics and has had poor PO intake as well. Today's renal function is significantly worse than prior on review of her records on her cell phone. At this point I will have her brought into the hospital for ongoing rehydration and monitoring. She does have an elevated troponin although I have a low suspicion for ACS at this point in this is likely reactive from a renal dysfunction. At this point I discussed case with hospitals was agreeable ongoing inpatient care.        FINAL IMPRESSION  1. Dizziness    2. Prerenal acute renal failure (HCC)            Electronically signed by: Jax Chaudhary M.D., 12/30/2021 11:59 PM

## 2021-12-31 NOTE — ASSESSMENT & PLAN NOTE
In setting of dehydration and FREDA on CKD, mildly elevated into the 50s-60s likely due to ischemic demand, no chest pain, EKG admission without any ST elevations or depressions.  Patient also with evidence of pulmonary HTN on echo which may be partially contributory   1/2/2022: Patient had ST depression noted in leads V1-V3, troponins elevated 144, 143, 131.  BNP 23270.  Patient asymptomatic.  Per daughter, has stress test scheduled outpatient next week in [place].  Consulted cardiology, patient at baseline and okay to CO home with cardiology follow-up.  Stress test ordered.  Repeat EKG with any chest pain

## 2021-12-31 NOTE — ED NOTES
ERP at bedside. Pt agrees with plan of care discussed by ERP. AIDET acknowledged with patient. Koko in low position, side rail up for pt safety. Call light within reach. Will continue to monitor.

## 2021-12-31 NOTE — ED NOTES
Med Rec completed per patient   Tried to call pt's daughter to verify pt's meds, no answer, message left   Called pt's home pharmacy ([facility]) and they only had Carvedilol on file for pt   Allergies reviewed  No ORAL antibiotics in last 30 days

## 2021-12-31 NOTE — ASSESSMENT & PLAN NOTE
Chronic kidney disease stage IIIb present on arrival with superimposed acute kidney injury  Suspecting Prerenal given decreased p.o. intake, continued diuretic use despite presyncopal episodes and clinically dry on admission.  FeNa studies pending  U/a with trace leukocytes, patient asymptomatic.  CPK normal  Continue IVF  Renal dose meds and avoid nephrotoxins  Monitor I&O's  Follow renal function  No need for urgent RRT

## 2021-12-31 NOTE — ASSESSMENT & PLAN NOTE
Status post 5 vessel CABG and mitral valve repair  Continue aspirin, statin  Due to worsening renal function she is not on ACE inhibitor or ARB Entresto or Aldactone per outpatient cardiology note.

## 2022-01-01 ENCOUNTER — APPOINTMENT (OUTPATIENT)
Dept: CARDIOLOGY | Facility: MEDICAL CENTER | Age: 82
DRG: 682 | End: 2022-01-01
Attending: INTERNAL MEDICINE
Payer: MEDICARE

## 2022-01-01 ENCOUNTER — APPOINTMENT (OUTPATIENT)
Dept: RADIOLOGY | Facility: MEDICAL CENTER | Age: 82
DRG: 682 | End: 2022-01-01
Attending: HOSPITALIST
Payer: MEDICARE

## 2022-01-01 LAB
ALBUMIN SERPL BCP-MCNC: 3.8 G/DL (ref 3.2–4.9)
ANION GAP SERPL CALC-SCNC: 12 MMOL/L (ref 7–16)
BASOPHILS # BLD AUTO: 0.7 % (ref 0–1.8)
BASOPHILS # BLD: 0.05 K/UL (ref 0–0.12)
BUN SERPL-MCNC: 74 MG/DL (ref 8–22)
CALCIUM SERPL-MCNC: 9.6 MG/DL (ref 8.4–10.2)
CHLORIDE SERPL-SCNC: 108 MMOL/L (ref 96–112)
CHOLEST SERPL-MCNC: 170 MG/DL (ref 100–199)
CO2 SERPL-SCNC: 27 MMOL/L (ref 20–33)
CREAT SERPL-MCNC: 1.93 MG/DL (ref 0.5–1.4)
EOSINOPHIL # BLD AUTO: 0.1 K/UL (ref 0–0.51)
EOSINOPHIL NFR BLD: 1.3 % (ref 0–6.9)
ERYTHROCYTE [DISTWIDTH] IN BLOOD BY AUTOMATED COUNT: 47.3 FL (ref 35.9–50)
GLUCOSE BLD-MCNC: 137 MG/DL (ref 65–99)
GLUCOSE BLD-MCNC: 158 MG/DL (ref 65–99)
GLUCOSE BLD-MCNC: 176 MG/DL (ref 65–99)
GLUCOSE BLD-MCNC: 75 MG/DL (ref 65–99)
GLUCOSE SERPL-MCNC: 83 MG/DL (ref 65–99)
HCT VFR BLD AUTO: 29.5 % (ref 37–47)
HDLC SERPL-MCNC: 32 MG/DL
HGB BLD-MCNC: 9.1 G/DL (ref 12–16)
IMM GRANULOCYTES # BLD AUTO: 0.02 K/UL (ref 0–0.11)
IMM GRANULOCYTES NFR BLD AUTO: 0.3 % (ref 0–0.9)
LDLC SERPL CALC-MCNC: 107 MG/DL
LV EJECT FRACT  99904: 25
LYMPHOCYTES # BLD AUTO: 2.62 K/UL (ref 1–4.8)
LYMPHOCYTES NFR BLD: 34.9 % (ref 22–41)
MCH RBC QN AUTO: 28.4 PG (ref 27–33)
MCHC RBC AUTO-ENTMCNC: 30.8 G/DL (ref 33.6–35)
MCV RBC AUTO: 92.2 FL (ref 81.4–97.8)
MONOCYTES # BLD AUTO: 0.6 K/UL (ref 0–0.85)
MONOCYTES NFR BLD AUTO: 8 % (ref 0–13.4)
NEUTROPHILS # BLD AUTO: 4.12 K/UL (ref 2–7.15)
NEUTROPHILS NFR BLD: 54.8 % (ref 44–72)
NRBC # BLD AUTO: 0 K/UL
NRBC BLD-RTO: 0 /100 WBC
PHOSPHATE SERPL-MCNC: 3.9 MG/DL (ref 2.5–4.5)
PLATELET # BLD AUTO: 195 K/UL (ref 164–446)
PMV BLD AUTO: 10.5 FL (ref 9–12.9)
POTASSIUM SERPL-SCNC: 4.8 MMOL/L (ref 3.6–5.5)
RBC # BLD AUTO: 3.2 M/UL (ref 4.2–5.4)
SODIUM SERPL-SCNC: 147 MMOL/L (ref 135–145)
TRIGL SERPL-MCNC: 154 MG/DL (ref 0–149)
TSH SERPL DL<=0.005 MIU/L-ACNC: 1.29 UIU/ML (ref 0.38–5.33)
WBC # BLD AUTO: 7.5 K/UL (ref 4.8–10.8)

## 2022-01-01 PROCEDURE — 80061 LIPID PANEL: CPT

## 2022-01-01 PROCEDURE — 71045 X-RAY EXAM CHEST 1 VIEW: CPT

## 2022-01-01 PROCEDURE — A9270 NON-COVERED ITEM OR SERVICE: HCPCS | Performed by: STUDENT IN AN ORGANIZED HEALTH CARE EDUCATION/TRAINING PROGRAM

## 2022-01-01 PROCEDURE — 80069 RENAL FUNCTION PANEL: CPT

## 2022-01-01 PROCEDURE — 85025 COMPLETE CBC W/AUTO DIFF WBC: CPT

## 2022-01-01 PROCEDURE — 700101 HCHG RX REV CODE 250: Performed by: STUDENT IN AN ORGANIZED HEALTH CARE EDUCATION/TRAINING PROGRAM

## 2022-01-01 PROCEDURE — 93306 TTE W/DOPPLER COMPLETE: CPT | Mod: 26 | Performed by: INTERNAL MEDICINE

## 2022-01-01 PROCEDURE — 97162 PT EVAL MOD COMPLEX 30 MIN: CPT

## 2022-01-01 PROCEDURE — 93306 TTE W/DOPPLER COMPLETE: CPT

## 2022-01-01 PROCEDURE — 770020 HCHG ROOM/CARE - TELE (206)

## 2022-01-01 PROCEDURE — 700117 HCHG RX CONTRAST REV CODE 255: Performed by: INTERNAL MEDICINE

## 2022-01-01 PROCEDURE — 82962 GLUCOSE BLOOD TEST: CPT

## 2022-01-01 PROCEDURE — 99232 SBSQ HOSP IP/OBS MODERATE 35: CPT | Performed by: INTERNAL MEDICINE

## 2022-01-01 PROCEDURE — 84443 ASSAY THYROID STIM HORMONE: CPT

## 2022-01-01 PROCEDURE — 700102 HCHG RX REV CODE 250 W/ 637 OVERRIDE(OP): Performed by: STUDENT IN AN ORGANIZED HEALTH CARE EDUCATION/TRAINING PROGRAM

## 2022-01-01 PROCEDURE — 97165 OT EVAL LOW COMPLEX 30 MIN: CPT

## 2022-01-01 PROCEDURE — 700111 HCHG RX REV CODE 636 W/ 250 OVERRIDE (IP): Performed by: STUDENT IN AN ORGANIZED HEALTH CARE EDUCATION/TRAINING PROGRAM

## 2022-01-01 RX ORDER — FUROSEMIDE 20 MG/1
20 TABLET ORAL
Status: DISCONTINUED | OUTPATIENT
Start: 2022-01-02 | End: 2022-01-03 | Stop reason: HOSPADM

## 2022-01-01 RX ORDER — FUROSEMIDE 40 MG/1
40 TABLET ORAL
Status: DISCONTINUED | OUTPATIENT
Start: 2022-01-01 | End: 2022-01-01

## 2022-01-01 RX ADMIN — HEPARIN SODIUM 5000 UNITS: 5000 INJECTION, SOLUTION INTRAVENOUS; SUBCUTANEOUS at 15:06

## 2022-01-01 RX ADMIN — HUMAN ALBUMIN MICROSPHERES AND PERFLUTREN 3 ML: 10; .22 INJECTION, SOLUTION INTRAVENOUS at 10:53

## 2022-01-01 RX ADMIN — OMEPRAZOLE 20 MG: 20 CAPSULE, DELAYED RELEASE ORAL at 04:35

## 2022-01-01 RX ADMIN — ATORVASTATIN CALCIUM 20 MG: 20 TABLET, FILM COATED ORAL at 04:34

## 2022-01-01 RX ADMIN — INSULIN HUMAN 3 UNITS: 100 INJECTION, SOLUTION PARENTERAL at 17:22

## 2022-01-01 RX ADMIN — GABAPENTIN 600 MG: 300 CAPSULE ORAL at 15:06

## 2022-01-01 RX ADMIN — INSULIN HUMAN 3 UNITS: 100 INJECTION, SOLUTION PARENTERAL at 20:58

## 2022-01-01 RX ADMIN — LEVOTHYROXINE SODIUM 175 MCG: 0.17 TABLET ORAL at 04:35

## 2022-01-01 RX ADMIN — GABAPENTIN 600 MG: 300 CAPSULE ORAL at 04:34

## 2022-01-01 RX ADMIN — SERTRALINE HYDROCHLORIDE 50 MG: 50 TABLET ORAL at 04:35

## 2022-01-01 RX ADMIN — ASPIRIN 81 MG: 81 TABLET, COATED ORAL at 04:33

## 2022-01-01 RX ADMIN — LIDOCAINE 1 PATCH: 50 PATCH TOPICAL at 04:37

## 2022-01-01 RX ADMIN — INSULIN GLARGINE 20 UNITS: 100 INJECTION, SOLUTION SUBCUTANEOUS at 17:23

## 2022-01-01 RX ADMIN — GABAPENTIN 600 MG: 300 CAPSULE ORAL at 17:21

## 2022-01-01 ASSESSMENT — ENCOUNTER SYMPTOMS
NAUSEA: 0
COUGH: 0
TINGLING: 0
MYALGIAS: 0
SORE THROAT: 0
FLANK PAIN: 0
FALLS: 0
DIARRHEA: 0
ABDOMINAL PAIN: 0
NERVOUS/ANXIOUS: 0
VOMITING: 0
PALPITATIONS: 0
DEPRESSION: 0
FEVER: 0
FOCAL WEAKNESS: 0
WEAKNESS: 0
SPUTUM PRODUCTION: 0
HEADACHES: 0
DIZZINESS: 1
SHORTNESS OF BREATH: 0
CHILLS: 0

## 2022-01-01 ASSESSMENT — GAIT ASSESSMENTS
DISTANCE (FEET): 100
DISTANCE (FEET): 25
GAIT LEVEL OF ASSIST: MINIMAL ASSIST
DEVIATION: STEP TO
ASSISTIVE DEVICE: SINGLE POINT CANE

## 2022-01-01 ASSESSMENT — COGNITIVE AND FUNCTIONAL STATUS - GENERAL
HELP NEEDED FOR BATHING: A LITTLE
DAILY ACTIVITIY SCORE: 22
SUGGESTED CMS G CODE MODIFIER DAILY ACTIVITY: CJ
DRESSING REGULAR LOWER BODY CLOTHING: A LITTLE

## 2022-01-01 ASSESSMENT — ACTIVITIES OF DAILY LIVING (ADL): TOILETING: INDEPENDENT

## 2022-01-01 NOTE — THERAPY
Physical Therapy   Initial Evaluation     Patient Name: Bita Griffiths  Age:  81 y.o., Sex:  female  Medical Record #: [id number]  Today's Date: 1/1/2022     Precautions  Precautions: (P) Fall Risk    Assessment  Patient is 81 y.o. female with a diagnosis of FREDA Pt lives at home with daughter and is mod active.Pt is safe with ambulation with supervision.She says daughter is always at home with her.Pt appears to be around base line level of function.She has no equipment needs      Plan    Recommend Physical Therapy for Evaluation only      01/01/22 1100   Total Time Spent   Total Time Spent (Mins) 30   Charge Group   PT Evaluation PT Evaluation Mod   Initial Contact Note    Initial Contact Note Order Received and Verified, Physical Therapy Evaluation in Progress with Full Report to Follow.   Precautions   Precautions Fall Risk   Pain 0 - 10 Group   Therapist Pain Assessment 0   Prior Living Situation   Prior Services Intermittent Physical Support for ADL Per Family   Housing / Facility 1 Story House   Steps Into Home 2   Steps In Home 0   Equipment Owned Front-Wheel Walker;Single Point Cane   Lives with - Patient's Self Care Capacity Adult Children   Prior Level of Functional Mobility   Bed Mobility Independent   Transfer Status Independent   Ambulation Independent   Distance Ambulation (Feet)   (limited community)   Assistive Devices Used Single Point Cane   Stairs Independent   History of Falls   History of Falls Yes   Cognition    Cognition / Consciousness WDL   Level of Consciousness Alert   Passive ROM Lower Body   Passive ROM Lower Body WDL   Active ROM Lower Body    Active ROM Lower Body  WDL   Strength Lower Body   Lower Body Strength  X   Comments general weakness   Sensation Lower Body   Lower Extremity Sensation   X   Coordination Lower Body    Coordination Lower Body  WDL   Balance Assessment   Sitting Balance (Static) Good   Sitting Balance (Dynamic) Fair +   Standing Balance (Static) Fair   Standing  Balance (Dynamic) Fair   Weight Shift Sitting Fair   Weight Shift Standing Fair   Gait Analysis   Gait Level Of Assist Minimal Assist   Assistive Device Single Point Cane   Distance (Feet) 100   # of Times Distance was Traveled 2   Deviation Step To   Weight Bearing Status full   Bed Mobility    Supine to Sit Minimal Assist   Sit to Supine Minimal Assist   Scooting Modified Independent   Functional Mobility   Sit to Stand Supervised   Bed, Chair, Wheelchair Transfer Supervised   Transfer Method Stand Step   Activity Tolerance   Sitting Edge of Bed 10   Standing 10   Patient / Family Goals    Patient / Family Goal #1 Home   Anticipated Discharge Equipment and Recommendations   DC Equipment Recommendations None   Discharge Recommendations Anticipate that the patient will have no further physical therapy needs after discharge from the hospital   Interdisciplinary Plan of Care Collaboration   IDT Collaboration with  Nursing   Session Information   Date / Session Number  1/1   Priority 0       DC Equipment Recommendations: (P) None  Discharge Recommendations: (P) Anticipate that the patient will have no further physical therapy needs after discharge from the hospital

## 2022-01-01 NOTE — PROGRESS NOTES
Hospital Medicine Daily Progress Note    Date of Service  1/1/2022    Chief Complaint  Bita Griffiths is a 81 y.o. female admitted 12/30/2021 with syncopal episodes x2    Hospital Course  Patient is an 81-year-old female with a past medical history of HFrEF EF 25-30%, CAD s/p 5 vessel CABG, mitral valve repair, CKD with creatinine 2.3, hypertension, hyperlipidemia, JUAN, type 2 diabetes mellitus who presented 12/30/2021 with complaints of dizziness and 2 episodes of presyncope.  She states that she is visiting family from [place] and during her drive over the [place], do not have access to her water due to it being in the back of the car and she had also continue to take her diuretic.  Additionally, she states that she had a recent cardiac medication change with her carvedilol but was unspecific regarding details..  She reports, [place] she [place] the mall, she felt dizzy and requested family to help her to the ground to prevent her from falling.  She was seen in the emergency department and received for rehydration.  She states she is feeling better when she was turning from the bathroom, she began to feel dizzy/lightheaded again and was assisted to the floor.  She denies hitting her head or losing consciousness at any point.  She denies recent illness, fever, chills, nausea, vomiting, chest, headache, shortness of breath.  Blood pressure remains 100s-120s with holding diuretic and beta-blocker.  WBC normal and no signs of infection.  Na 137, K4.4, BUN 81, CR 2.17, GFR 22, P 4.5 Ca 9.6.  Serial troponin is noted to be elevated at 54, 58, 67.  EKG shows sinus rhythm with a rate of 85 bpm, left ventricular hypertrophy and first-degree AV block noted.  No ischemic changes seen.  The absence of chest pain, likely due to ischemic demand.  Head CT with no acute changes/processes.  Urine creatinine and sodium levels are pending.        Interval Problem Update  1/1/2022: Echocardiogram was done showing  decreased ejection fraction of 25%, reduced right ventricular systolic function, and mild mitral and tricuspid regurgitation.  She is remained normotensive without her Lasix and carvedilol.  Lasix resumed given previous tolerance of the administration and current reduced ejection fraction.  Will change beta-blocker to metoprolol.  Patient states feeling unsteady and dizzy with standing for prolonged periods of time.  She reports unsteadiness due to her hips.  PT evaluated patient and cleared patient for home.    I have personally seen and examined the patient at bedside. I discussed the plan of care with patient and bedside RN.    Consultants/Specialty  None    Code Status  Full Code    Disposition  Patient is not medically cleared for discharge.   Anticipate discharge to to home with close outpatient follow-up.  I have placed the appropriate orders for post-discharge needs.    Review of Systems  Review of Systems   Constitutional: Negative for chills, fever and malaise/fatigue.   HENT: Negative for congestion and sore throat.    Respiratory: Negative for cough, sputum production and shortness of breath.    Cardiovascular: Negative for chest pain, palpitations and leg swelling.   Gastrointestinal: Negative for abdominal pain, diarrhea, nausea and vomiting.   Genitourinary: Negative for dysuria, flank pain and frequency.   Musculoskeletal: Positive for joint pain (hips). Negative for falls and myalgias.   Neurological: Positive for dizziness (with prolonged standing). Negative for tingling, focal weakness, weakness and headaches.   Psychiatric/Behavioral: Negative for depression. The patient is not nervous/anxious.         Physical Exam  Temp:  [36.4 °C (97.5 °F)-37.2 °C (98.9 °F)] 37.2 °C (98.9 °F)  Pulse:  [] 87  Resp:  [12-29] 17  BP: ()/(32-74) 137/53  SpO2:  [91 %-94 %] 93 %    Physical Exam  Vitals and nursing note reviewed.   Constitutional:       General: She is not in acute distress.      Appearance: Normal appearance. She is not ill-appearing or toxic-appearing.   HENT:      Head: Normocephalic and atraumatic.      Mouth/Throat:      Mouth: Mucous membranes are moist.      Pharynx: Oropharynx is clear.   Eyes:      Extraocular Movements: Extraocular movements intact.      Conjunctiva/sclera: Conjunctivae normal.   Cardiovascular:      Rate and Rhythm: Normal rate and regular rhythm.      Pulses: Normal pulses.      Heart sounds: Normal heart sounds. No murmur heard.      Pulmonary:      Effort: Pulmonary effort is normal. No respiratory distress.      Breath sounds: Normal breath sounds. No rhonchi.   Abdominal:      General: Abdomen is flat. Bowel sounds are normal. There is no distension.      Palpations: Abdomen is soft.      Tenderness: There is no abdominal tenderness.   Musculoskeletal:      Cervical back: Normal range of motion and neck supple.      Right lower leg: Edema (+1 nonpitting edema) present.      Left lower leg: Edema ( +1 nonpitting edema) present.   Skin:     General: Skin is warm and dry.      Capillary Refill: Capillary refill takes less than 2 seconds.      Coloration: Skin is not jaundiced.   Neurological:      General: No focal deficit present.      Mental Status: She is alert and oriented to person, place, and time.   Psychiatric:         Mood and Affect: Mood normal.         Behavior: Behavior normal.         Thought Content: Thought content normal.         Judgment: Judgment normal.         Fluids    Intake/Output Summary (Last 24 hours) at 1/1/2022 1157  Last data filed at 1/1/2022 0000  Gross per 24 hour   Intake 1401.67 ml   Output --   Net 1401.67 ml       Laboratory  Recent Labs     12/30/21 2250 01/01/22  0416   WBC 7.4 7.5   RBC 3.44* 3.20*   HEMOGLOBIN 9.9* 9.1*   HEMATOCRIT 32.1* 29.5*   MCV 93.3 92.2   MCH 28.8 28.4   MCHC 30.8* 30.8*   RDW 47.4 47.3   PLATELETCT 198 195   MPV 10.1 10.5     Recent Labs     12/30/21 2250 01/01/22  0416   SODIUM 137 147*    POTASSIUM 4.4 4.8   CHLORIDE 98 108   CO2 25 27   GLUCOSE 163* 83   BUN 81* 74*   CREATININE 2.17* 1.93*   CALCIUM 9.6 9.6                   Imaging  EC-ECHOCARDIOGRAM COMPLETE W/ CONT   Final Result      CT-HEAD W/O   Final Result      1.  Head CT without contrast with evidence of acute cerebral infarction, hemorrhage or mass lesion.   2.  Hypodensity in the right parieto-occipital lobe, likely encephalomalacia.           Assessment/Plan  * Postural dizziness with presyncope- (present on admission)  Assessment & Plan  Experienced outpatient x2 with decreased p.o. intake and continued diuretic use.  Patient also reports she was recently started on carvedilol.  Carvedilol was held on admission, will change to metoprolol for less systemic effects.  Monitor on telemetry.  IV fluids    HFrEF (heart failure with reduced ejection fraction) (HCC)- (present on admission)  Assessment & Plan  Continue aspirin  Per outpatient cardiology note in care everywhere due to worsening renal function she is not on ACE inhibitor, ARB or Entresto, not on Aldactone  Was recently started on carvedilol.  Will transition to metoprolol monitor.  Echocardiogram done 1/1/2022 showing EF 25%, reduced right ventricular systolic function, mild mitral and tricuspid regurgitation.  RVSP 55.    Acute kidney injury superimposed on chronic kidney disease (HCC)- (present on admission)  Assessment & Plan  Chronic kidney disease stage IIIb present on arrival with superimposed acute kidney injury  Suspecting Prerenal given decreased p.o. intake, continued diuretic use despite presyncopal episodes and clinically dry.  FeNa studies ordered  U/a with trace leukocytes, patient asymptomatic.  CPK normal  Continue IVF  Renal dose meds and avoid nephrotoxins  Monitor I&O's  Follow renal function  No need for urgent RRT    Coronary artery disease involving coronary bypass graft of native heart without angina pectoris- (present on admission)  Assessment &  Plan  Status post 5 vessel CABG and mitral valve repair  Continue aspirin, statin  Due to worsening renal function she is not on ACE inhibitor or ARB Entresto or Aldactone per outpatient cardiology note.    Type 2 diabetes mellitus with hyperglycemia, without long-term current use of insulin (HCC)- (present on admission)  Assessment & Plan  Insulin sliding scale  Continue long-acting insulin    Elevated troponin- (present on admission)  Assessment & Plan  In setting of dehydration and FREDA on CKD, mildly elevated into the 50s, no chest pain, EKG without any ST elevations or depressions.  Troponin is mildly elevated, due to increased ischemic demand.  Repeat EKG with any chest pain    Hyperlipidemia- (present on admission)  Assessment & Plan  Continue statin    Gastroesophageal reflux disease without esophagitis- (present on admission)  Assessment & Plan  Continue PPI    Primary hypertension- (present on admission)  Assessment & Plan  Will resume Lasix and change carvedilol to metoprolol.    Acquired hypothyroidism- (present on admission)  Assessment & Plan  Continue Synthroid       VTE prophylaxis: enoxaparin ppx    I have performed a physical exam and reviewed and updated ROS and Plan today (1/1/2022). In review of yesterday's note (12/31/2021), there are no changes except as documented above.

## 2022-01-01 NOTE — PROGRESS NOTES
Skin Check:     Head WNL  Ears WNL  Nose WNL  Mouth WNL  Neck WNL  Breast/chest Midline Scar  Shoulder blades WNL  Spine WNL  RUE light scattered bruising  LUE light scattered bruising  abd WNL  Groin WNL  Buttocks WNL  RLE WNL  LLE  WNL  r foot/heel WNL  l foot/heel WNL

## 2022-01-01 NOTE — HOSPITAL COURSE
Patient is an 81-year-old female with a past medical history of HFrEF EF 25-30%, CAD s/p 5 vessel CABG, mitral valve repair, CKD with creatinine 2.3, hypertension, hyperlipidemia, JUAN, type 2 diabetes mellitus who presented 12/30/2021 with complaints of dizziness and 2 episodes of presyncope.  She states that she is visiting family for [place] and during her drive over the [employer], do not have access to her water due to it being in the back of the car and she had also continue to take her diuretic.  Additionally, she states that she had a recent cardiac medication change with her carvedilol but was unspecific regarding details..  She reports, [place] the mall, she felt dizzy and requested family to help her to the ground to prevent her from falling.  She was seen in the emergency department and received for rehydration.  She states she is feeling better when she was turning from the bathroom, she began to feel dizzy/lightheaded again and was assisted to the floor.  She denies hitting her head or losing consciousness at any point.  She denies recent illness, fever, chills, nausea, vomiting, chest, headache, shortness of breath.  Blood pressure remains 100s-120s with holding diuretic and beta-blocker.  WBC normal and no signs of infection.  Na 137, K4.4, BUN 81, CR 2.17, GFR 22, P 4.5 Ca 9.6.  Serial troponin is noted to be elevated at 54, 58, 67.  EKG shows sinus rhythm with a rate of 85 bpm, left ventricular hypertrophy and first-degree AV block noted.  No ischemic changes seen.  The absence of chest pain, likely due to ischemic demand.  Head CT with no acute changes/processes.  Urine creatinine and sodium levels are pending.  Echocardiogram was done 1/1/2022 showing decreased ejection fraction of 25%, reduced right ventricular systolic function, and mild mitral and tricuspid regurgitation.  She is remained normotensive without her Lasix and carvedilol.  Lasix was resumed at 20 mg in addition to  metoprolol 12.5 mg twice daily.  During the night on 1/1/2022, ST depression seen on V1 by nursing.  Patient denied chest pain and was asymptomatic.  Twelve-lead EKG showed ST depressions in leads V1-V3, serial troponins were increased 144, 143, 131 compared to previous troponins that were in the 50s-60s on admission.  BNP repeated 16,164.  Cardiology was consulted.  Stress test was completed which revealed findings suggestive of moderate scarring but was otherwise negative for acute ischemia. Patient was cleared by cardiology to discharge home with close follow-up with cardiologist in [place].  Patient is awake, alert, and eager for discharge.   She states that she feels well, has been able to ambulate with the use of her walker, denies shortness of breath, chest pain, headache, dizziness. She has been tolerating resuming diuretic and beta-blocker, vital signs have remained stable.  Per outpatient cardiology note 12/9/2021, patient had worsening creatinine of 1.9-2.31 in November 2021.  For this reason, she was not placed on ACE inhibitor, ARB, or Entresto.  Furthermore she was not on Aldactone. Her creatinine is 1.93 on day of discharge but we will defer to outpatient cardiology to reassess/monitor initiating therapy.  At this point, we will continue Lasix 20 mg twice daily and metoprolol 12.5 mg twice daily for home and have patient follow-up with outpatient cardiology.

## 2022-01-01 NOTE — CARE PLAN
The patient is Stable - Low risk of patient condition declining or worsening    Shift Goals  Clinical Goals: comfort,   Patient Goals: comfort    Progress made toward(s) clinical / shift goals:   Problem: Pain - Standard  Goal: Alleviation of pain or a reduction in pain to the patient’s comfort goal  Outcome: Progressing     Problem: Knowledge Deficit - Standard  Goal: Patient and family/care givers will demonstrate understanding of plan of care, disease process/condition, diagnostic tests and medications  Outcome: Progressing     Problem: Fall Risk  Goal: Patient will remain free from falls  Outcome: Progressing     Patient AOX4, denies nausea, c/o 4/10 chronic back pain, repositioned.    Purewick in place, clear yellow urine.    Will check bloodsugar prior to eating

## 2022-01-01 NOTE — PROGRESS NOTES
Received report from day shift RN. Assumed care of patient. Patient is currently AOx4, reporting mild pain with scrape to face that was from her glasses, band aid applied. Also stated that she does have lower back pain from recent fall - denies hitting her head. Intermittently low BP seen, will continue to monitor. Per patient, this has been an ongoing issue trying to get her BP under control. POC reivewed with patient, pt verbalized understanding. Hourly rounding in place. Bed locked and in lowest position, call light and belongings within reach. Chart check complete.

## 2022-01-01 NOTE — PROGRESS NOTES
Assumed care of patient. Bedside report received from night shift RN. Patient is sleeping comfortably at this time. Call light is within reach and fall precautions are in place. All needs met at this time. Hourly rounding in place.

## 2022-01-01 NOTE — CARE PLAN
The patient is Watcher - Medium risk of patient condition declining or worsening    Shift Goals  Clinical Goals: Maintain MAP above 65, less episodes of dizziness / presyncope  Patient Goals: Less dizziness    Progress made toward(s) clinical / shift goals: MAP stayed over 65 overnight. Pt got up to chair and back with no dizziness this AM. Generalized weakness seen.     Patient is not progressing towards the following goals: N/A

## 2022-01-01 NOTE — PROGRESS NOTES
Telemetry Shift Summary     Rhythm: SR with BBB  HR: 80-92  Ectopy: r PVC    Measurements: 0.20/0.12/0.36    Normal Values  Rhythm: SR  HR:   Measurements: 0.12-0.20/0.08-0.10/0.30-0.52

## 2022-01-01 NOTE — PROGRESS NOTES
Telemetry Shift Summary    Rhythm: SR/ST  HR:   Ectopy: N/A    Measurements for strip printed 12/31/2021 at 1700    0.18 / 0.14 / 0.34    Normal Values  Rhythm: SR  HR:   Measurements: 0.12-0.20 / 0.06-0.10 / 0.30-0.52

## 2022-01-02 ENCOUNTER — APPOINTMENT (OUTPATIENT)
Dept: RADIOLOGY | Facility: MEDICAL CENTER | Age: 82
DRG: 682 | End: 2022-01-02
Attending: NURSE PRACTITIONER
Payer: MEDICARE

## 2022-01-02 LAB
ANION GAP SERPL CALC-SCNC: 12 MMOL/L (ref 7–16)
BUN SERPL-MCNC: 67 MG/DL (ref 8–22)
CALCIUM SERPL-MCNC: 9.7 MG/DL (ref 8.4–10.2)
CHLORIDE SERPL-SCNC: 105 MMOL/L (ref 96–112)
CO2 SERPL-SCNC: 27 MMOL/L (ref 20–33)
CREAT SERPL-MCNC: 1.93 MG/DL (ref 0.5–1.4)
EKG IMPRESSION: NORMAL
EKG IMPRESSION: NORMAL
ERYTHROCYTE [DISTWIDTH] IN BLOOD BY AUTOMATED COUNT: 47.4 FL (ref 35.9–50)
GLUCOSE BLD-MCNC: 153 MG/DL (ref 65–99)
GLUCOSE BLD-MCNC: 175 MG/DL (ref 65–99)
GLUCOSE BLD-MCNC: 216 MG/DL (ref 65–99)
GLUCOSE BLD-MCNC: 68 MG/DL (ref 65–99)
GLUCOSE BLD-MCNC: 89 MG/DL (ref 65–99)
GLUCOSE SERPL-MCNC: 61 MG/DL (ref 65–99)
HCT VFR BLD AUTO: 29.5 % (ref 37–47)
HGB BLD-MCNC: 9.3 G/DL (ref 12–16)
MAGNESIUM SERPL-MCNC: 2.2 MG/DL (ref 1.5–2.5)
MCH RBC QN AUTO: 29.1 PG (ref 27–33)
MCHC RBC AUTO-ENTMCNC: 31.5 G/DL (ref 33.6–35)
MCV RBC AUTO: 92.2 FL (ref 81.4–97.8)
NT-PROBNP SERPL IA-MCNC: ABNORMAL PG/ML (ref 0–125)
PHOSPHATE SERPL-MCNC: 3.7 MG/DL (ref 2.5–4.5)
PLATELET # BLD AUTO: 185 K/UL (ref 164–446)
PMV BLD AUTO: 10 FL (ref 9–12.9)
POTASSIUM SERPL-SCNC: 4.7 MMOL/L (ref 3.6–5.5)
RBC # BLD AUTO: 3.2 M/UL (ref 4.2–5.4)
SODIUM SERPL-SCNC: 144 MMOL/L (ref 135–145)
TROPONIN T SERPL-MCNC: 131 NG/L (ref 6–19)
TROPONIN T SERPL-MCNC: 143 NG/L (ref 6–19)
TROPONIN T SERPL-MCNC: 144 NG/L (ref 6–19)
WBC # BLD AUTO: 7 K/UL (ref 4.8–10.8)

## 2022-01-02 PROCEDURE — 78452 HT MUSCLE IMAGE SPECT MULT: CPT | Mod: 26 | Performed by: INTERNAL MEDICINE

## 2022-01-02 PROCEDURE — 83880 ASSAY OF NATRIURETIC PEPTIDE: CPT

## 2022-01-02 PROCEDURE — 700102 HCHG RX REV CODE 250 W/ 637 OVERRIDE(OP): Performed by: STUDENT IN AN ORGANIZED HEALTH CARE EDUCATION/TRAINING PROGRAM

## 2022-01-02 PROCEDURE — 93018 CV STRESS TEST I&R ONLY: CPT | Performed by: INTERNAL MEDICINE

## 2022-01-02 PROCEDURE — A9502 TC99M TETROFOSMIN: HCPCS

## 2022-01-02 PROCEDURE — 99233 SBSQ HOSP IP/OBS HIGH 50: CPT | Performed by: INTERNAL MEDICINE

## 2022-01-02 PROCEDURE — 85027 COMPLETE CBC AUTOMATED: CPT

## 2022-01-02 PROCEDURE — 93005 ELECTROCARDIOGRAM TRACING: CPT | Performed by: HOSPITALIST

## 2022-01-02 PROCEDURE — 82962 GLUCOSE BLOOD TEST: CPT

## 2022-01-02 PROCEDURE — 80048 BASIC METABOLIC PNL TOTAL CA: CPT

## 2022-01-02 PROCEDURE — 83735 ASSAY OF MAGNESIUM: CPT

## 2022-01-02 PROCEDURE — 93005 ELECTROCARDIOGRAM TRACING: CPT | Performed by: INTERNAL MEDICINE

## 2022-01-02 PROCEDURE — 93010 ELECTROCARDIOGRAM REPORT: CPT | Mod: 59 | Performed by: INTERNAL MEDICINE

## 2022-01-02 PROCEDURE — A9270 NON-COVERED ITEM OR SERVICE: HCPCS | Performed by: STUDENT IN AN ORGANIZED HEALTH CARE EDUCATION/TRAINING PROGRAM

## 2022-01-02 PROCEDURE — 700111 HCHG RX REV CODE 636 W/ 250 OVERRIDE (IP)

## 2022-01-02 PROCEDURE — 700111 HCHG RX REV CODE 636 W/ 250 OVERRIDE (IP): Performed by: STUDENT IN AN ORGANIZED HEALTH CARE EDUCATION/TRAINING PROGRAM

## 2022-01-02 PROCEDURE — 700101 HCHG RX REV CODE 250: Performed by: STUDENT IN AN ORGANIZED HEALTH CARE EDUCATION/TRAINING PROGRAM

## 2022-01-02 PROCEDURE — 770020 HCHG ROOM/CARE - TELE (206)

## 2022-01-02 PROCEDURE — 84100 ASSAY OF PHOSPHORUS: CPT

## 2022-01-02 PROCEDURE — 93010 ELECTROCARDIOGRAM REPORT: CPT | Mod: 76,59 | Performed by: INTERNAL MEDICINE

## 2022-01-02 PROCEDURE — 84484 ASSAY OF TROPONIN QUANT: CPT | Mod: 91

## 2022-01-02 PROCEDURE — A9270 NON-COVERED ITEM OR SERVICE: HCPCS | Performed by: INTERNAL MEDICINE

## 2022-01-02 PROCEDURE — 700102 HCHG RX REV CODE 250 W/ 637 OVERRIDE(OP): Performed by: INTERNAL MEDICINE

## 2022-01-02 RX ORDER — REGADENOSON 0.08 MG/ML
INJECTION, SOLUTION INTRAVENOUS
Status: COMPLETED
Start: 2022-01-02 | End: 2022-01-02

## 2022-01-02 RX ADMIN — LIDOCAINE 1 PATCH: 50 PATCH TOPICAL at 06:10

## 2022-01-02 RX ADMIN — HEPARIN SODIUM 5000 UNITS: 5000 INJECTION, SOLUTION INTRAVENOUS; SUBCUTANEOUS at 21:05

## 2022-01-02 RX ADMIN — LEVOTHYROXINE SODIUM 175 MCG: 0.17 TABLET ORAL at 06:09

## 2022-01-02 RX ADMIN — ASPIRIN 81 MG: 81 TABLET, COATED ORAL at 06:08

## 2022-01-02 RX ADMIN — INSULIN GLARGINE 20 UNITS: 100 INJECTION, SOLUTION SUBCUTANEOUS at 17:16

## 2022-01-02 RX ADMIN — ACETAMINOPHEN 650 MG: 325 TABLET, FILM COATED ORAL at 21:21

## 2022-01-02 RX ADMIN — ATORVASTATIN CALCIUM 20 MG: 20 TABLET, FILM COATED ORAL at 06:09

## 2022-01-02 RX ADMIN — HEPARIN SODIUM 5000 UNITS: 5000 INJECTION, SOLUTION INTRAVENOUS; SUBCUTANEOUS at 15:10

## 2022-01-02 RX ADMIN — HEPARIN SODIUM 5000 UNITS: 5000 INJECTION, SOLUTION INTRAVENOUS; SUBCUTANEOUS at 06:08

## 2022-01-02 RX ADMIN — FUROSEMIDE 20 MG: 20 TABLET ORAL at 06:09

## 2022-01-02 RX ADMIN — INSULIN HUMAN 4 UNITS: 100 INJECTION, SOLUTION PARENTERAL at 21:08

## 2022-01-02 RX ADMIN — METOPROLOL TARTRATE 12.5 MG: 25 TABLET, FILM COATED ORAL at 17:13

## 2022-01-02 RX ADMIN — GABAPENTIN 600 MG: 300 CAPSULE ORAL at 17:13

## 2022-01-02 RX ADMIN — REGADENOSON 0.4 MG: 0.08 INJECTION, SOLUTION INTRAVENOUS at 13:40

## 2022-01-02 RX ADMIN — OMEPRAZOLE 20 MG: 20 CAPSULE, DELAYED RELEASE ORAL at 06:09

## 2022-01-02 RX ADMIN — SERTRALINE HYDROCHLORIDE 50 MG: 50 TABLET ORAL at 06:09

## 2022-01-02 RX ADMIN — INSULIN HUMAN 3 UNITS: 100 INJECTION, SOLUTION PARENTERAL at 17:16

## 2022-01-02 RX ADMIN — GABAPENTIN 600 MG: 300 CAPSULE ORAL at 11:19

## 2022-01-02 RX ADMIN — METOPROLOL TARTRATE 12.5 MG: 25 TABLET, FILM COATED ORAL at 06:09

## 2022-01-02 RX ADMIN — FUROSEMIDE 20 MG: 20 TABLET ORAL at 15:10

## 2022-01-02 RX ADMIN — GABAPENTIN 600 MG: 300 CAPSULE ORAL at 06:08

## 2022-01-02 ASSESSMENT — ENCOUNTER SYMPTOMS
FALLS: 0
PALPITATIONS: 0
TREMORS: 0
HEADACHES: 0
ABDOMINAL PAIN: 0
FOCAL WEAKNESS: 0
VOMITING: 0
MYALGIAS: 0
SORE THROAT: 0
NERVOUS/ANXIOUS: 0
CHILLS: 0
DEPRESSION: 0
DIZZINESS: 0
DIARRHEA: 0
FLANK PAIN: 0
WEAKNESS: 0
FEVER: 0
HEMOPTYSIS: 0
SHORTNESS OF BREATH: 0
COUGH: 0
SPUTUM PRODUCTION: 0
NAUSEA: 0
TINGLING: 0

## 2022-01-02 ASSESSMENT — COGNITIVE AND FUNCTIONAL STATUS - GENERAL
SUGGESTED CMS G CODE MODIFIER DAILY ACTIVITY: CJ
WALKING IN HOSPITAL ROOM: A LITTLE
STANDING UP FROM CHAIR USING ARMS: A LITTLE
MOVING FROM LYING ON BACK TO SITTING ON SIDE OF FLAT BED: A LITTLE
SUGGESTED CMS G CODE MODIFIER MOBILITY: CK
HELP NEEDED FOR BATHING: A LITTLE
TOILETING: A LITTLE
MOVING TO AND FROM BED TO CHAIR: A LITTLE
MOBILITY SCORE: 19
DAILY ACTIVITIY SCORE: 20
CLIMB 3 TO 5 STEPS WITH RAILING: A LITTLE
DRESSING REGULAR UPPER BODY CLOTHING: A LITTLE
DRESSING REGULAR LOWER BODY CLOTHING: A LITTLE

## 2022-01-02 ASSESSMENT — PAIN DESCRIPTION - PAIN TYPE: TYPE: ACUTE PAIN;CHRONIC PAIN

## 2022-01-02 NOTE — PROGRESS NOTES
Brief Cardiology Note:    I was called to discuss this patients care with Ms Urias. We discussed pt there with indeterminate troponin and low EF presented with near syncope in setting of dehydration    Recent note from cardiology team in [place] reviewed, records here reviewed    Appears at baseline, would dc with close follow up with her cardiology group in [place]    At this time it was deemed no formal in person cardiology consultation was necessary, however if this changes due to changes in patient condition or abnormal test results, please re-consult cardiology.     Electronically signed: Dustin Robb MD PhD [facility]  Cardiologist [facility]    Please note that this dictation was created using voice recognition software. There are errors of grammar and possibly content I did not discover before finalizing the note.     1/2/2022  9:06 AM

## 2022-01-02 NOTE — PROGRESS NOTES
0105 Received report from JOE Bettencourt at bedside.   0110 Patient up to floor. Patient has no complaints of chest pain at this time.   0115 EKG complete.   0118 Dr. Smart notified that EKG is in.

## 2022-01-02 NOTE — PROGRESS NOTES
4 Eyes Skin Assessment Completed by Arabella RN and JOE Fang.    Head WDL  Ears WDL  Nose Scab irritation from glasses  Mouth WDL  Neck WDL  Breast/Chest WDL  Shoulder Blades WDL  Spine WDL  (R) Arm/Elbow/Hand WDL  (L) Arm/Elbow/Hand WDL  Abdomen WDL  Groin WDL  Scrotum/Coccyx/Buttocks Redness and Blanching  (R) Leg WDL  (L) Leg WDL  (R) Heel/Foot/Toe WDL  (L) Heel/Foot/Toe WDL          Devices In Places Tele Box and Pulse Ox PIV      Interventions In Place Pillows, Barrier Cream and Pressure Redistribution Mattress    Possible Skin Injury No    Pictures Uploaded Into Epic N/A  Wound Consult Placed N/A  RN Wound Prevention Protocol Ordered No

## 2022-01-02 NOTE — PROGRESS NOTES
Telemetry Shift Summary    Rhythm SR with a BBB  HR Range 71 - 89  Ectopy r - o PVCs & rCOUP  Measurements 0.18/0.12/0.34        Normal Values  Rhythm SR  HR Range    Measurements 0.12-0.20 / 0.06-0.10  / 0.30-0.52

## 2022-01-02 NOTE — PROGRESS NOTES
Received report from day shift RN. Assumed care of patient. Patient is currently AOx4, reporting no pain at this time, reports intermittently lower right back pain from pulled muscle. POC reviewed with the patient. Patient verbalized understanding. Bed locked and in lowest position, call light and belongings within reach. Chart check complete.   1936 Updated MD about IVF at this time, recent echo results, and patient reporting less dizziness at this time and drinking fluids - IVF dc'd at this time    2320 MD updated about rhythm changes reported from monitor tech. ST depression and T wave in V1 is changing morphology.   Patient sleeping at time of reported rhythm change, denies chest pain.     0040 MD updated about increased trop and BNP.     0050 Report given to JOE Rebollar. Transferred to med/tele 310-1

## 2022-01-02 NOTE — CARE PLAN
The patient is Stable - Low risk of patient condition declining or worsening    Shift Goals  Clinical Goals: monitor HR and rhythm, diurese, monitor labs  Patient Goals: go home    Progress made toward(s) clinical / shift goals:      Problem: Pain - Standard  Goal: Alleviation of pain or a reduction in pain to the patient’s comfort goal  Outcome: Progressing     Problem: Knowledge Deficit - Standard  Goal: Patient and family/care givers will demonstrate understanding of plan of care, disease process/condition, diagnostic tests and medications  Outcome: Progressing     Problem: Fall Risk  Goal: Patient will remain free from falls  Outcome: Progressing     Problem: Skin Integrity  Goal: Skin integrity is maintained or improved  Outcome: Progressing

## 2022-01-02 NOTE — THERAPY
Occupational Therapy   Initial Evaluation     Patient Name: Bita Griffiths  Age:  81 y.o., Sex:  female  Medical Record #: [id number]  Today's Date: 1/1/2022     Precautions  Precautions: (P) Fall Risk    Assessment  Patient is 81 y.o. female admitted with syncopal episodes. A&Ox3. Motivated for OOB. Walks to br,sink, to doorway and then to chair with FWW, SBA. Toileting, grooming with SBA. LB drsg with Raghu. Tolerates UIC post session. Lives with dtr who helps as needed. Pt appears to be near baseline; no further OT needs.            Plan  Recommend Occupational Therapy for Evaluation only for the following treatments:  .  DC Equipment Recommendations: None  Discharge Recommendations: Anticipate that the patient will have no further occupational therapy needs after discharge from the hospital      01/01/22 1064   Prior Living Situation   Prior Services Intermittent Physical Support for ADL Per Family   Housing / Facility 1 Story House   Bathroom Set up Walk In Shower;Shower Chair   Equipment Owned Front-Wheel Walker;Single Point Cane;Tub / Shower Seat;Raised Toilet Seat Without Arms   Lives with - Patient's Self Care Capacity Adult Children   Prior Level of ADL Function   Self Feeding Independent   Grooming / Hygiene Independent   Bathing Requires Assist   Dressing Independent   Toileting Independent   Prior Level of IADL Function   Medication Management Unable To Determine At This Time   Laundry Requires Assist   Kitchen Mobility Independent   Finances Unable To Determine At This Time   Home Management Requires Assist   Shopping Requires Assist   Prior Level Of Mobility Independent With Device in Home   Driving / Transportation Relatives / Others Provide Transportation   Occupation (Pre-Hospital Vocational) Retired Due To Age   Leisure Interests Unable To Determine At This Time   History of Falls   History of Falls Yes   Precautions   Precautions Fall Risk   Vitals   O2 Delivery Device None - Room Air   Pain 0  - 10 Group   Therapist Pain Assessment Post Activity Pain Same as Prior to Activity;Nurse Notified   Cognition    Cognition / Consciousness WDL   Level of Consciousness Alert   Passive ROM Upper Body   Passive ROM Upper Body WDL   Active ROM Upper Body   Active ROM Upper Body  WDL   Strength Upper Body   Upper Body Strength  WDL   Upper Body Muscle Tone   Upper Body Muscle Tone  WDL   Coordination Upper Body   Coordination WDL   Balance Assessment   Sitting Balance (Static) Good   Sitting Balance (Dynamic) Fair +   Standing Balance (Static) Fair +   Standing Balance (Dynamic) Fair   Weight Shift Sitting Good   Weight Shift Standing Fair   Comments fww   Bed Mobility    Supine to Sit Standby Assist   ADL Assessment   Grooming Supervision;Standing   Lower Body Dressing Standby Assist   Toileting Standby Assist   How much help from another person does the patient currently need...   Putting on and taking off regular lower body clothing? 3   Bathing (including washing, rinsing, and drying)? 3   Toileting, which includes using a toilet, bedpan, or urinal? 4   Putting on and taking off regular upper body clothing? 4   Taking care of personal grooming such as brushing teeth? 4   Eating meals? 4   6 Clicks Daily Activity Score 22   Functional Mobility   Sit to Stand Supervised   Bed, Chair, Wheelchair Transfer Standby Assist   Toilet Transfers Standby Assist   Transfer Method Stand Step   Distance (Feet) 25   # of Times Distance was Traveled 2   Activity Tolerance   Sitting in Chair >1hr   Sitting Edge of Bed 13   Standing 8

## 2022-01-02 NOTE — PROGRESS NOTES
Report received from JOE Rebollar. Patient awake and resting comfortably in bed during time of report. Patient A&O4, VSS. Bed is locked and in lowest position, bed alarm on, call light in reach. No needs at this time. Will continue to monitor throughout shift and provide appropriate interventions as they arise.

## 2022-01-02 NOTE — PROGRESS NOTES
Hospital Medicine Daily Progress Note    Date of Service  1/2/2022    Chief Complaint  Bita Griffiths is a 81 y.o. female admitted 12/30/2021 with syncopal episodes x2    Hospital Course  Patient is an 81-year-old female with a past medical history of HFrEF EF 25-30%, CAD s/p 5 vessel CABG, mitral valve repair, CKD with creatinine 2.3, hypertension, hyperlipidemia, JUAN, type 2 diabetes mellitus who presented 12/30/2021 with complaints of dizziness and 2 episodes of presyncope.  She states that she is visiting family from [place] and during her drive over the [employer], do not have access to her water due to it being in the back of the car and she had also continue to take her diuretic.  Additionally, she states that she had a recent cardiac medication change with her carvedilol but was unspecific regarding details..  She reports, [place] the mall, she felt dizzy and requested family to help her to the ground to prevent her from falling.  She was seen in the emergency department and received for rehydration.  She states she is feeling better when she was turning from the bathroom, she began to feel dizzy/lightheaded again and was assisted to the floor.  She denies hitting her head or losing consciousness at any point.  She denies recent illness, fever, chills, nausea, vomiting, chest, headache, shortness of breath.  Blood pressure remains 100s-120s with holding diuretic and beta-blocker.  WBC normal and no signs of infection.  Na 137, K4.4, BUN 81, CR 2.17, GFR 22, P 4.5 Ca 9.6.  Serial troponin is noted to be elevated at 54, 58, 67.  EKG shows sinus rhythm with a rate of 85 bpm, left ventricular hypertrophy and first-degree AV block noted.  No ischemic changes seen.  The absence of chest pain, likely due to ischemic demand.  Head CT with no acute changes/processes.  Urine creatinine and sodium levels are pending.        Interval Problem Update  1/1/2022: Echocardiogram was done showing  decreased ejection fraction of 25%, reduced right ventricular systolic function, and mild mitral and tricuspid regurgitation.  She is remained normotensive without her Lasix and carvedilol.  Lasix resumed given previous tolerance of the administration and current reduced ejection fraction.  Will change beta-blocker to metoprolol.  Patient states feeling unsteady and dizzy with standing for prolonged periods of time.  She reports unsteadiness due to her hips.  PT evaluated patient and cleared patient for home.    1/2/2022: Left nine 2300-hour, patient with ST depression seen in V1.  Per notes, patient 9 chest pain and was asymptomatic.  Twelve-lead EKG shows ST depression in leads V1-V3, serial troponins increased 144, 143, 131 from previous troponins in 50s-60s on admission, BNP 16,164.  Potassium 4.7, magnesium 2.2, BUN/creatinine increased but improving compared to admission near baseline.  Patient reports feeling well.  Daughter at bedside reports that patient has a stress test scheduled next week, which is the earliest she could arrange when scheduled last month.  Consulted cardiology, who states patient appears to be at baseline and okay for DC with follow-up with [place] [facility] group.  Due to EKG changes and acute increases in serial troponins, stress test ordered.     I have personally seen and examined the patient at bedside. I discussed the plan of care with patient, family and bedside RN.    Consultants/Specialty  None    Code Status  Full Code    Disposition  Patient is not medically cleared for discharge.   Anticipate discharge to to home with close outpatient follow-up.  I have placed the appropriate orders for post-discharge needs.    Review of Systems  Review of Systems   Constitutional: Negative for chills, fever and malaise/fatigue.   HENT: Negative for congestion and sore throat.    Respiratory: Negative for cough, hemoptysis, sputum production and shortness of breath.    Cardiovascular:  Negative for chest pain, palpitations and leg swelling.   Gastrointestinal: Negative for abdominal pain, diarrhea, nausea and vomiting.   Genitourinary: Negative for dysuria, flank pain, hematuria and urgency.   Musculoskeletal: Positive for joint pain (hips). Negative for falls and myalgias.   Neurological: Negative for dizziness, tingling, tremors, focal weakness, weakness and headaches.   Psychiatric/Behavioral: Negative for depression. The patient is not nervous/anxious.         Physical Exam  Temp:  [36.7 °C (98.1 °F)-37.1 °C (98.7 °F)] 36.7 °C (98.1 °F)  Pulse:  [74-96] 74  Resp:  [14-16] 16  BP: (126-175)/(42-66) 148/56  SpO2:  [90 %-94 %] 92 %    Physical Exam  Vitals and nursing note reviewed.   Constitutional:       General: She is not in acute distress.     Appearance: Normal appearance. She is not ill-appearing or toxic-appearing.   HENT:      Head: Normocephalic and atraumatic.      Mouth/Throat:      Mouth: Mucous membranes are moist.      Pharynx: Oropharynx is clear.   Eyes:      Extraocular Movements: Extraocular movements intact.      Conjunctiva/sclera: Conjunctivae normal.   Cardiovascular:      Rate and Rhythm: Normal rate and regular rhythm.      Pulses: Normal pulses.      Heart sounds: Normal heart sounds. No murmur heard.  No gallop.    Pulmonary:      Effort: Pulmonary effort is normal. No respiratory distress.      Breath sounds: Normal breath sounds. No rhonchi.   Chest:      Chest wall: No tenderness.   Abdominal:      General: Abdomen is flat. Bowel sounds are normal. There is no distension.      Palpations: Abdomen is soft.      Tenderness: There is no abdominal tenderness.   Musculoskeletal:         General: No tenderness.      Cervical back: Normal range of motion and neck supple.      Right lower leg: Edema (+1 nonpitting edema) present.      Left lower leg: Edema ( +1 nonpitting edema) present.   Skin:     General: Skin is warm and dry.      Capillary Refill: Capillary refill takes  less than 2 seconds.      Coloration: Skin is not jaundiced.   Neurological:      General: No focal deficit present.      Mental Status: She is alert and oriented to person, place, and time. Mental status is at baseline.   Psychiatric:         Mood and Affect: Mood normal.         Behavior: Behavior normal.         Thought Content: Thought content normal.         Judgment: Judgment normal.         Fluids    Intake/Output Summary (Last 24 hours) at 1/2/2022 1234  Last data filed at 1/2/2022 0925  Gross per 24 hour   Intake 480 ml   Output 1200 ml   Net -720 ml       Laboratory  Recent Labs     12/30/21  2250 01/01/22  0416 01/02/22  0008   WBC 7.4 7.5 7.0   RBC 3.44* 3.20* 3.20*   HEMOGLOBIN 9.9* 9.1* 9.3*   HEMATOCRIT 32.1* 29.5* 29.5*   MCV 93.3 92.2 92.2   MCH 28.8 28.4 29.1   MCHC 30.8* 30.8* 31.5*   RDW 47.4 47.3 47.4   PLATELETCT 198 195 185   MPV 10.1 10.5 10.0     Recent Labs     12/30/21  2250 01/01/22  0416 01/02/22  0008   SODIUM 137 147* 144   POTASSIUM 4.4 4.8 4.7   CHLORIDE 98 108 105   CO2 25 27 27   GLUCOSE 163* 83 61*   BUN 81* 74* 67*   CREATININE 2.17* 1.93* 1.93*   CALCIUM 9.6 9.6 9.7             Recent Labs     01/01/22  0416   TRIGLYCERIDE 154*   HDL 32*   *       Imaging  DX-CHEST-PORTABLE (1 VIEW)   Final Result      1.  Prior open heart surgery.   2.  No pneumonia or overt pulmonary edema.   3.  Minimal LEFT pleural fluid versus pleural reactive change.      EC-ECHOCARDIOGRAM COMPLETE W/ CONT   Final Result      CT-HEAD W/O   Final Result      1.  Head CT without contrast with evidence of acute cerebral infarction, hemorrhage or mass lesion.   2.  Hypodensity in the right parieto-occipital lobe, likely encephalomalacia.      NM-CARDIAC STRESS TEST    (Results Pending)        Assessment/Plan  * Postural dizziness with presyncope- (present on admission)  Assessment & Plan  Experienced outpatient x2 with decreased p.o. intake and continued diuretic use.  Patient also reports she was  recently started on carvedilol.  Carvedilol was held on admission, transitioned to metoprolol for less systemic effects.  Monitor on telemetry.  IV fluids    HFrEF (heart failure with reduced ejection fraction) ([facility])- (present on admission)  Assessment & Plan  Per outpatient cardiology note in care everywhere due to worsening renal function she is not on ACE inhibitor, ARB or Entresto, not on Aldactone  Echocardiogram done 1/1/2022 showing EF 25%, reduced right ventricular systolic function, mild mitral and tricuspid regurgitation.  RVSP 55.  Continue aspirin, hold BB pending stress test    Acute kidney injury superimposed on chronic kidney disease ([facility])- (present on admission)  Assessment & Plan  Chronic kidney disease stage IIIb present on arrival with superimposed acute kidney injury  Suspecting Prerenal given decreased p.o. intake, continued diuretic use despite presyncopal episodes and clinically dry on admission.  FeNa studies pending  U/a with trace leukocytes, patient asymptomatic.  CPK normal  Continue IVF  Renal dose meds and avoid nephrotoxins  Monitor I&O's  Follow renal function  No need for urgent RRT    Coronary artery disease involving coronary bypass graft of native heart without angina pectoris- (present on admission)  Assessment & Plan  Status post 5 vessel CABG and mitral valve repair  Continue aspirin, statin  Due to worsening renal function she is not on ACE inhibitor or ARB Entresto or Aldactone per outpatient cardiology note.    Type 2 diabetes mellitus with hyperglycemia, without long-term current use of insulin ([facility])- (present on admission)  Assessment & Plan  Hemoglobin A1c 9/2021 7.9  Insulin sliding scale  Continue long-acting insulin    Elevated troponin- (present on admission)  Assessment & Plan  In setting of dehydration and FREDA on CKD, mildly elevated into the 50s-60s likely due to ischemic demand, no chest pain, EKG admission without any ST elevations or depressions.  Patient also  with evidence of pulmonary HTN on echo which may be partially contributory   1/2/2022: Patient had ST depression noted in leads V1-V3, troponins elevated 144, 143, 131.  BNP 76245.  Patient asymptomatic.  Per daughter, has stress test scheduled outpatient next week in [place].  Consulted cardiology, patient at baseline and okay to [place] home with cardiology follow-up.  Stress test ordered.  Repeat EKG with any chest pain    Hyperlipidemia- (present on admission)  Assessment & Plan  Continue statin    Gastroesophageal reflux disease without esophagitis- (present on admission)  Assessment & Plan  Continue PPI    Primary hypertension- (present on admission)  Assessment & Plan  Will resume Lasix and change carvedilol to metoprolol.    Acquired hypothyroidism- (present on admission)  Assessment & Plan  Continue Synthroid       VTE prophylaxis: enoxaparin ppx    I have performed a physical exam and reviewed and updated ROS and Plan today (1/2/2022). In review of yesterday's note (1/1/2022), there are no changes except as documented above.

## 2022-01-02 NOTE — PROGRESS NOTES
OVERNIGHT HOSPITALIST:    Notified by nursing staff regarding telemetry showing new ST depression in lead V1.  Patient is asymptomatic and not complaining of chest pain or any other symptoms.  She does have extensive cardiac history.    Chart was reviewed.  Noticed that the last troponin was 67 ng/L at 6:54 AM yesterday.  I requested labs to be done earlier just after midnight and will add a troponin, BNP, mag and phosphorus.  Also added chest x-ray and will check for fluid overload as this patient has been receiving fluid and her EF is 25%.    We will closely monitor and have a low threshold to upgrade her to telemetry unit.          12:47 am: Troponin came back at 144 ng/L, BNP 16,164.  Potassium is 4.7 and no other significant abnormalities except for creatinine that remains elevated at 1.93.  Chest x-ray did not showed any pulmonary edema and or infiltrates.    I am upgrading patient to telemetry in order EKG.  Patient remains asymptomatic at this time and will continue to closely monitor.

## 2022-01-02 NOTE — PROGRESS NOTES
Telemetry Shift Summary    Rhythm SR  HR Range 81-91  Ectopy rare PVCs  Measurements .20/.10/.40        Normal Values  Rhythm SR  HR Range    Measurements 0.12-0.20 / 0.06-0.10  / 0.30-0.52

## 2022-01-03 VITALS
HEIGHT: 65 IN | SYSTOLIC BLOOD PRESSURE: 151 MMHG | TEMPERATURE: 98.2 F | RESPIRATION RATE: 17 BRPM | BODY MASS INDEX: 27.84 KG/M2 | WEIGHT: 167.11 LBS | OXYGEN SATURATION: 95 % | DIASTOLIC BLOOD PRESSURE: 59 MMHG | HEART RATE: 75 BPM

## 2022-01-03 LAB — GLUCOSE BLD-MCNC: 95 MG/DL (ref 65–99)

## 2022-01-03 PROCEDURE — A9270 NON-COVERED ITEM OR SERVICE: HCPCS | Performed by: INTERNAL MEDICINE

## 2022-01-03 PROCEDURE — 700102 HCHG RX REV CODE 250 W/ 637 OVERRIDE(OP): Performed by: STUDENT IN AN ORGANIZED HEALTH CARE EDUCATION/TRAINING PROGRAM

## 2022-01-03 PROCEDURE — A9270 NON-COVERED ITEM OR SERVICE: HCPCS | Performed by: STUDENT IN AN ORGANIZED HEALTH CARE EDUCATION/TRAINING PROGRAM

## 2022-01-03 PROCEDURE — 700101 HCHG RX REV CODE 250: Performed by: STUDENT IN AN ORGANIZED HEALTH CARE EDUCATION/TRAINING PROGRAM

## 2022-01-03 PROCEDURE — 99239 HOSP IP/OBS DSCHRG MGMT >30: CPT | Performed by: INTERNAL MEDICINE

## 2022-01-03 PROCEDURE — 700102 HCHG RX REV CODE 250 W/ 637 OVERRIDE(OP): Performed by: INTERNAL MEDICINE

## 2022-01-03 PROCEDURE — 82962 GLUCOSE BLOOD TEST: CPT

## 2022-01-03 PROCEDURE — 700111 HCHG RX REV CODE 636 W/ 250 OVERRIDE (IP): Performed by: STUDENT IN AN ORGANIZED HEALTH CARE EDUCATION/TRAINING PROGRAM

## 2022-01-03 RX ORDER — FUROSEMIDE 20 MG/1
20 TABLET ORAL 2 TIMES DAILY
Qty: 60 TABLET | Refills: 0 | Status: SHIPPED | OUTPATIENT
Start: 2022-01-03

## 2022-01-03 RX ORDER — ASPIRIN 81 MG/1
81 TABLET ORAL DAILY
Qty: 30 TABLET | Refills: 0 | Status: SHIPPED | OUTPATIENT
Start: 2022-01-04

## 2022-01-03 RX ADMIN — SERTRALINE HYDROCHLORIDE 50 MG: 50 TABLET ORAL at 06:06

## 2022-01-03 RX ADMIN — LEVOTHYROXINE SODIUM 175 MCG: 0.17 TABLET ORAL at 06:06

## 2022-01-03 RX ADMIN — SENNOSIDES AND DOCUSATE SODIUM 2 TABLET: 50; 8.6 TABLET ORAL at 06:06

## 2022-01-03 RX ADMIN — ASPIRIN 81 MG: 81 TABLET, COATED ORAL at 06:06

## 2022-01-03 RX ADMIN — METOPROLOL TARTRATE 12.5 MG: 25 TABLET, FILM COATED ORAL at 06:07

## 2022-01-03 RX ADMIN — FUROSEMIDE 20 MG: 20 TABLET ORAL at 06:06

## 2022-01-03 RX ADMIN — OMEPRAZOLE 20 MG: 20 CAPSULE, DELAYED RELEASE ORAL at 06:06

## 2022-01-03 RX ADMIN — HEPARIN SODIUM 5000 UNITS: 5000 INJECTION, SOLUTION INTRAVENOUS; SUBCUTANEOUS at 06:07

## 2022-01-03 RX ADMIN — ATORVASTATIN CALCIUM 20 MG: 20 TABLET, FILM COATED ORAL at 06:06

## 2022-01-03 RX ADMIN — GABAPENTIN 600 MG: 300 CAPSULE ORAL at 06:07

## 2022-01-03 RX ADMIN — LIDOCAINE 1 PATCH: 50 PATCH TOPICAL at 06:07

## 2022-01-03 ASSESSMENT — FIBROSIS 4 INDEX: FIB4 SCORE: 3.24

## 2022-01-03 NOTE — PROGRESS NOTES
Telemetry Shift Summary    Rhythm SR w/ BBB  HR Range 70 - 84  Ectopy rPVCs  Measurements 0.20/0.12/0.40        Normal Values  Rhythm SR  HR Range    Measurements 0.12-0.20 / 0.06-0.10  / 0.30-0.52

## 2022-01-03 NOTE — CARE PLAN
The patient is Stable - Low risk of patient condition declining or worsening    Shift Goals  Clinical Goals: Monitor for cardiac abnormalities, Diurese patient, Safety  Patient Goals: Go Home    Progress made toward(s) clinical / shift goals:  Patient on continuous telemetry monitoring, receiving diuretics, calculating I&O's. Patient has safety precautions in place.   Problem: Pain - Standard  Goal: Alleviation of pain or a reduction in pain to the patient’s comfort goal  Outcome: Progressing     Problem: Knowledge Deficit - Standard  Goal: Patient and family/care givers will demonstrate understanding of plan of care, disease process/condition, diagnostic tests and medications  Outcome: Progressing

## 2022-01-03 NOTE — PROGRESS NOTES
Report given to JOE Rebollar. Patient awake and resting comfortably in bed during time of report. A&O4, VSS. Bed is locked and in lowest position, call light in reach, bed alarm on. Care released.

## 2022-01-03 NOTE — DISCHARGE INSTRUCTIONS
Discharge Instructions    Discharged to home by car with relative. Discharged via wheelchair, hospital escort: Refused.  Special equipment needed: Cane    Be sure to schedule a follow-up appointment with your primary care doctor or any specialists as instructed.     Discharge Plan:   Influenza Vaccine Indication: Not indicated: Previously immunized this influenza season and > 8 years of age    I understand that a diet low in cholesterol, fat, and sodium is recommended for good health. Unless I have been given specific instructions below for another diet, I accept this instruction as my diet prescription.     Special Instructions:     HF Patient Discharge Instructions  · Monitor your weight daily, and maintain a weight chart, to track your weight changes.   · Activity as tolerated, unless your Doctor has ordered otherwise.   · Follow a low fat, low cholesterol, low salt diet unless instructed otherwise by your Doctor. Read the labels on the back of food products and track your intake of fat, cholesterol and salt.   · If a Fluid Restriction has been ordered by your Doctor, measure fluids with a measuring cup to ensure that you are not exceeding the restriction.   · No smoking.     See the educational handout provided at discharge for more information on monitoring your daily weight, activity and diet. This also explains more about Heart Failure, symptoms of a flare-up and some of the tests that you have undergone.     Warning Signs of a Flare-Up include:  · Swelling in the ankles or lower legs.  · Shortness of breath, while at rest, or while doing normal activities.   · Shortness of breath at night when in bed, or coughing in bed.   · Requiring more pillows to sleep at night, or needing to sit up at night to sleep.  · Feeling weak, dizzy or fatigued.     When to call your Doctor:  · Call [facility] about questions regarding the discharge instructions you were given [phone].    · Call  your Primary Care Physician or Cardiologist if:   1. You experience any pain radiating to your jaw or neck.  2. You have any difficulty breathing.  3. You experience weight gain of 2 lbs in a day or 5 lbs in a week.   4. You feel any palpitations or irregular heartbeats.  5. You become dizzy or lose consciousness.   If you have had an angiogram or had a pacemaker or AICD placed, and experience:  1. Bleeding, drainage or swelling at the surgical / puncture site.  2. Fever greater than 100.0 F  3. Shock from internal defibrillator.  4. Cool and / or numb extremities.      Depression / Suicide Risk    As you are discharged from this [facility] facility, it is important to learn how to keep safe from harming yourself.    Recognize the warning signs:  · Abrupt changes in personality, positive or negative- including increase in energy   · Giving away possessions  · Change in eating patterns- significant weight changes-  positive or negative  · Change in sleeping patterns- unable to sleep or sleeping all the time   · Unwillingness or inability to communicate  · Depression  · Unusual sadness, discouragement and loneliness  · Talk of wanting to die  · Neglect of personal appearance   · Rebelliousness- reckless behavior  · Withdrawal from people/activities they love  · Confusion- inability to concentrate     If you or a loved one observes any of these behaviors or has concerns about self-harm, here's what you can do:  · Talk about it- your feelings and reasons for harming yourself  · Remove any means that you might use to hurt yourself (examples: pills, rope, extension cords, firearm)  · Get professional help from the community (Mental Health, Substance Abuse, psychological counseling)  · Do not be alone:Call your Safe Contact- someone whom you trust who will be there for you.  · Call your local CRISIS HOTLINE 655-7363 or 079-358-5225  · Call your local Children's Mobile Crisis Response Team [place] (632) 327-3245  or www.[employer]  · Call the toll free National Suicide Prevention Hotlines   · National Suicide Prevention Lifeline 698-907-FFYT (7435)  · National Hope Line Network 800-SUICIDE (213-3048)         Heart Failure, Self Care  Heart failure is a serious condition. This sheet explains things you need to do to take care of yourself at home. To help you stay as healthy as possible, you may be asked to change your diet, take certain medicines, and make other changes in your life. Your doctor may also give you more specific instructions. If you have problems or questions, call your doctor.  What are the risks?  Having heart failure makes it more likely for you to have some problems. These problems can get worse if you do not take good care of yourself. Problems may include:  · Blood clotting problems. This may cause a stroke.  · Damage to the kidneys, liver, or lungs.  · Abnormal heart rhythms.  Supplies needed:  · Scale for weighing yourself.  · Blood pressure monitor.  · Notebook.  · Medicines.  How to care for yourself when you have heart failure  Medicines  Take over-the-counter and prescription medicines only as told by your doctor. Take your medicines every day.  · Do not stop taking your medicine unless your doctor tells you to do so.  · Do not skip any medicines.  · Get your prescriptions refilled before you run out of medicine. This is important.  Eating and drinking    · Eat heart-healthy foods. Talk with a diet specialist (dietitian) to create an eating plan.  · Choose foods that:  ? Have no trans fat.  ? Are low in saturated fat and cholesterol.  · Choose healthy foods, such as:  ? Fresh or frozen fruits and vegetables.  ? Fish.  ? Low-fat (lean) meats.  ? Legumes, such as beans, peas, and lentils.  ? Fat-free or low-fat dairy products.  ? Whole-grain foods.  ? High-fiber foods.  · Limit salt (sodium) if told by your doctor. Ask your diet specialist to tell you which seasonings are healthy for your  heart.  · Cook in healthy ways instead of frying. Healthy ways of cooking include roasting, grilling, broiling, baking, poaching, steaming, and stir-frying.  · Limit how much fluid you drink, if told by your doctor.  Alcohol use  · Do not drink alcohol if:  ? Your doctor tells you not to drink.  ? Your heart was damaged by alcohol, or you have very bad heart failure.  ? You are pregnant, may be pregnant, or are planning to become pregnant.  · If you drink alcohol:  ? Limit how much you use to:  § 0-1 drink a day for women.  § 0-2 drinks a day for men.  ? Be aware of how much alcohol is in your drink. In the [place] one drink equals one 12 oz bottle of beer (355 mL), one 5 oz glass of wine (148 mL), or one 1½ oz glass of hard liquor (44 mL).  Lifestyle    · Do not use any products that contain nicotine or tobacco, such as cigarettes, e-cigarettes, and chewing tobacco. If you need help quitting, ask your doctor.  ? Do not use nicotine gum or patches before talking to your doctor.  · Do not use illegal drugs.  · Lose weight if told by your doctor.  · Do physical activity if told by your doctor. Talk to your doctor before you begin an exercise if:  ? You are an older adult.  ? You have very bad heart failure.  · Learn to manage stress. If you need help, ask your doctor.  · Get rehab (rehabilitation) to help you stay independent and to help with your quality of life.  · Plan time to rest when you get tired.  Check weight and blood pressure    · Weigh yourself every day. This will help you to know if fluid is building up in your body.  ? Weigh yourself every morning after you pee (urinate) and before you eat breakfast.  ? Wear the same amount of clothing each time.  ? Write down your daily weight. Give your record to your doctor.  · Check and write down your blood pressure as told by your doctor.  · Check your pulse as told by your doctor.  Dealing with very hot and very cold weather  · If it is very hot:  ? Avoid  activities that take a lot of energy.  ? Use air conditioning or fans, or find a cooler place.  ? Avoid caffeine and alcohol.  ? Wear clothing that is loose-fitting, lightweight, and light-colored.  · If it is very cold:  ? Avoid activities that take a lot of energy.  ? Layer your clothes.  ? Wear mittens or gloves, a hat, and a scarf when you go outside.  ? Avoid alcohol.  Follow these instructions at home:  · Stay up to date with shots (vaccines). Get pneumococcal and flu (influenza) shots.  · Keep all follow-up visits as told by your doctor. This is important.  Contact a doctor if:  · You gain weight quickly.  · You have increasing shortness of breath.  · You cannot do your normal activities.  · You get tired easily.  · You cough a lot.  · You don't feel like eating or feel like you may vomit (nauseous).  · You become puffy (swell) in your hands, feet, ankles, or belly (abdomen).  · You cannot sleep well because it is hard to breathe.  · You feel like your heart is beating fast (palpitations).  · You get dizzy when you stand up.  Get help right away if:  · You have trouble breathing.  · You or someone else notices a change in your behavior, such as having trouble staying awake.  · You have chest pain or discomfort.  · You pass out (faint).  These symptoms may be an emergency. Do not wait to see if the symptoms will go away. Get medical help right away. Call your local emergency services (911 in the [place] Do not drive yourself to the hospital.  Summary  · Heart failure is a serious condition. To care for yourself, you may have to change your diet, take medicines, and make other lifestyle changes.  · Take your medicines every day. Do not stop taking them unless your doctor tells you to do so.  · Eat heart-healthy foods, such as fresh or frozen fruits and vegetables, fish, lean meats, legumes, fat-free or low-fat dairy products, and whole-grain or high-fiber foods.  · Ask your doctor if you can drink alcohol. You  may have to stop alcohol use if you have very bad heart failure.  · Contact your doctor if you gain weight quickly or feel that your heart is beating too fast. Get help right away if you pass out, or have chest pain or trouble breathing.  This information is not intended to replace advice given to you by your health care provider. Make sure you discuss any questions you have with your health care provider.  Document Released: 04/01/2020 Document Revised: 03/31/2020 Document Reviewed: 04/01/2020  Elsevier Patient Education © 2020 [employer]

## 2022-01-03 NOTE — DISCHARGE SUMMARY
Discharge Summary    CHIEF COMPLAINT ON ADMISSION  Chief Complaint   Patient presents with   • Dizziness     dizzy spell [place] in the mall, had an assisted fall to the ground. Denies LOC and remembers the entire event. No hx of similar events. Recently began taking carvedilol a month ago but has never had any issues.        Reason for Admission  Dizziness     Admission Date  12/30/2021    CODE STATUS  Full Code    HPI & HOSPITAL COURSE    Patient is an 81-year-old female with a past medical history of HFrEF EF 25-30%, CAD s/p 5 vessel CABG, mitral valve repair, CKD with creatinine 2.3, hypertension, hyperlipidemia, JUAN, type 2 diabetes mellitus who presented 12/30/2021 with complaints of dizziness and 2 episodes of presyncope.  She states that she is visiting family for [place] and during her drive over the [employer], do not have access to her water due to it being in the back of the car and she had also continue to take her diuretic.  Additionally, she states that she had a recent cardiac medication change with her carvedilol but was unspecific regarding details..  She reports, while she was at the mall, she felt dizzy and requested family to help her to the ground to prevent her from falling.  She was seen in the emergency department and received for rehydration.  She states she is feeling better when she was turning from the bathroom, she began to feel dizzy/lightheaded again and was assisted to the floor.  She denies hitting her head or losing consciousness at any point.  She denies recent illness, fever, chills, nausea, vomiting, chest, headache, shortness of breath.  Blood pressure remains 100s-120s with holding diuretic and beta-blocker.  WBC normal and no signs of infection.  Na 137, K4.4, BUN 81, CR 2.17, GFR 22, P 4.5 Ca 9.6.  Serial troponin is noted to be elevated at 54, 58, 67.  EKG shows sinus rhythm with a rate of 85 bpm, left ventricular hypertrophy and first-degree AV block noted.  No  ischemic changes seen.  The absence of chest pain, likely due to ischemic demand.  Head CT with no acute changes/processes.  Urine creatinine and sodium levels are pending.  Echocardiogram was done 1/1/2022 showing decreased ejection fraction of 25%, reduced right ventricular systolic function, and mild mitral and tricuspid regurgitation.  She is remained normotensive without her Lasix and carvedilol.  Lasix was resumed at 20 mg in addition to metoprolol 12.5 mg twice daily.  During the night on 1/1/2022, ST depression seen on V1 by nursing.  Patient denied chest pain and was asymptomatic.  Twelve-lead EKG showed ST depressions in leads V1-V3, serial troponins were increased 144, 143, 131 compared to previous troponins that were in the 50s-60s on admission.  BNP repeated 16,164.  Cardiology was consulted.  Stress test was completed which revealed findings suggestive of moderate scarring but was otherwise negative for acute ischemia. Patient was cleared by cardiology to discharge home with close follow-up with cardiologist in [place].  Patient is awake, alert, and eager for discharge.   She states that she feels well, has been able to ambulate with the use of her walker, denies shortness of breath, chest pain, headache, dizziness. She has been tolerating resuming diuretic and beta-blocker, vital signs have remained stable.  Per outpatient cardiology note 12/9/2021, patient had worsening creatinine of 1.9-2.31 in November 2021.  For this reason, she was not placed on ACE inhibitor, ARB, or Entresto.  Furthermore she was not on Aldactone. Her creatinine is 1.93 on day of discharge but we will defer to outpatient cardiology to reassess/monitor initiating therapy.  At this point, we will continue Lasix 20 mg twice daily and metoprolol 12.5 mg twice daily for home and have patient follow-up with outpatient cardiology.      Therefore, she is discharged in good and stable condition to home with close outpatient  follow-up.    The patient met 2-midnight criteria for an inpatient stay at the time of discharge.    Discharge Date  1/3/2022    FOLLOW UP ITEMS POST DISCHARGE  Follow-up with outpatient cardiologist    DISCHARGE DIAGNOSES  Principal Problem:    Postural dizziness with presyncope POA: Yes  Active Problems:    Acute kidney injury superimposed on chronic kidney disease (HCC) POA: Yes    HFrEF (heart failure with reduced ejection fraction) ([facility]) POA: Yes    Acquired hypothyroidism POA: Yes    Primary hypertension POA: Yes    Gastroesophageal reflux disease without esophagitis POA: Yes    Hyperlipidemia POA: Yes    Elevated troponin POA: Yes    Type 2 diabetes mellitus with hyperglycemia, without long-term current use of insulin ([facility]) POA: Yes    Coronary artery disease involving coronary bypass graft of native heart without angina pectoris POA: Yes  Resolved Problems:    * No resolved hospital problems. *      FOLLOW UP  Follow-up with primary care provider and outpatient cardiologist upon returning home to [place].    MEDICATIONS ON DISCHARGE     Medication List      START taking these medications      Instructions   aspirin 81 MG EC tablet  Start taking on: January 4, 2022   Take 1 Tablet by mouth every day.  Dose: 81 mg     metoprolol tartrate 25 MG Tabs  Commonly known as: LOPRESSOR   Take 0.5 Tablets by mouth 2 times a day.  Dose: 12.5 mg        CHANGE how you take these medications      Instructions   furosemide 20 MG Tabs  What changed:   · medication strength  · how much to take  · when to take this  Commonly known as: LASIX   Take 1 Tablet by mouth 2 times a day.  Dose: 20 mg        CONTINUE taking these medications      Instructions   atorvastatin 20 MG Tabs  Commonly known as: LIPITOR   Take 20 mg by mouth every day.  Dose: 20 mg     gabapentin 600 MG tablet  Commonly known as: NEURONTIN   Take 1 Tablet by mouth 2 times a day.  Dose: 1 Tablet     Lantus SoloStar 100 UNIT/ML Sopn injection  Generic drug:  insulin glargine   Inject 22 Units under the skin every evening.  Dose: 22 Units     levothyroxine 175 MCG Tabs  Commonly known as: SYNTHROID   Take 175 mcg by mouth every morning on an empty stomach.  Dose: 175 mcg     multivitamin Tabs   Take 1 Tablet by mouth every day.  Dose: 1 Tablet     pantoprazole 40 MG Tbec  Commonly known as: PROTONIX   Take 40 mg by mouth every day.  Dose: 40 mg     sertraline 50 MG Tabs  Commonly known as: Zoloft   Take 50 mg by mouth every morning.  Dose: 50 mg     traZODone 50 MG Tabs  Commonly known as: DESYREL   Take 50 mg by mouth at bedtime as needed for Sleep.  Dose: 50 mg        STOP taking these medications    acetaminophen 500 MG Tabs  Commonly known as: TYLENOL     carvedilol 3.125 MG Tabs  Commonly known as: COREG            Allergies  Allergies   Allergen Reactions   • Prednisone Unspecified     Causes high blood sugar    • Sulfa Drugs Unspecified     Stomach ulcers       DIET  Orders Placed This Encounter   Procedures   • Diet Order Diet: Consistent CHO (Diabetic); Second Modifier: (optional): Cardiac     Standing Status:   Standing     Number of Occurrences:   1     Order Specific Question:   Diet:     Answer:   Consistent CHO (Diabetic) [4]     Order Specific Question:   Second Modifier: (optional)     Answer:   Cardiac [6]       ACTIVITY  As tolerated.  Weight bearing as tolerated    CONSULTATIONS  Cardiology    PROCEDURES  Stress test 1/2/2022    LABORATORY  Lab Results   Component Value Date    SODIUM 144 01/02/2022    POTASSIUM 4.7 01/02/2022    CHLORIDE 105 01/02/2022    CO2 27 01/02/2022    GLUCOSE 61 (L) 01/02/2022    BUN 67 (H) 01/02/2022    CREATININE 1.93 (H) 01/02/2022        Lab Results   Component Value Date    WBC 7.0 01/02/2022    HEMOGLOBIN 9.3 (L) 01/02/2022    HEMATOCRIT 29.5 (L) 01/02/2022    PLATELETCT 185 01/02/2022        Total time of the discharge process exceeds 31 minutes.

## 2022-01-03 NOTE — PROGRESS NOTES
Telemetry Shift Summary     RHYTHM: SR BBB  HR RANGE: 76-95  ECTOPY: r PVC  MEASUREMENTS: 0.20/0.12/0.36  Measurements for strip printed 1/2/22 at 14:42:47  Normal Measurements  Rhythm: SR  HR RANGE:   Measurements: 0.12-0.20/0.06-0.10/0.30-0.52      Tele strips reviewed and placed in chart

## 2022-01-03 NOTE — PROGRESS NOTES
Discharge instructions provided to patient. Special instructions regarding CHF & book to log daily weights & symptoms at home. Reinforced importance of patient following up with her cardiologist & to make appointment as soon as possible. Educated on proper diet and max 2 g sodium limit per day. Instructed when to call provider, and when to seek emergency help. Discussed new medication Metoprolol & instructed patient to let her cardiologist know about new medication changes. Patient discharged & sent home with daughter. Assisted to lobby via wheelchair with CNA. All belongings sent with patient.